# Patient Record
Sex: MALE | Race: BLACK OR AFRICAN AMERICAN | URBAN - METROPOLITAN AREA
[De-identification: names, ages, dates, MRNs, and addresses within clinical notes are randomized per-mention and may not be internally consistent; named-entity substitution may affect disease eponyms.]

---

## 2017-07-19 ENCOUNTER — HISTORICAL (OUTPATIENT)
Dept: INTERNAL MEDICINE | Facility: CLINIC | Age: 27
End: 2017-07-19

## 2017-07-19 LAB
ALBUMIN SERPL-MCNC: 4.1 GM/DL (ref 3.4–5)
ALBUMIN/GLOB SERPL: 1 RATIO (ref 1–2)
ALP SERPL-CCNC: 98 UNIT/L (ref 45–117)
ALT SERPL-CCNC: 24 UNIT/L (ref 12–78)
AST SERPL-CCNC: 27 UNIT/L (ref 15–37)
BILIRUB SERPL-MCNC: 0.5 MG/DL (ref 0.2–1)
BILIRUBIN DIRECT+TOT PNL SERPL-MCNC: 0.1 MG/DL
BILIRUBIN DIRECT+TOT PNL SERPL-MCNC: 0.4 MG/DL
BUN SERPL-MCNC: 14 MG/DL (ref 7–18)
CALCIUM SERPL-MCNC: 9.3 MG/DL (ref 8.5–10.1)
CHLORIDE SERPL-SCNC: 107 MMOL/L (ref 98–107)
CO2 SERPL-SCNC: 30 MMOL/L (ref 21–32)
CREAT SERPL-MCNC: 1.4 MG/DL (ref 0.6–1.3)
GLOBULIN SER-MCNC: 3.8 GM/ML (ref 2.3–3.5)
GLUCOSE SERPL-MCNC: 82 MG/DL (ref 74–106)
HCV AB SERPL QL IA: NONREACTIVE
HIV 1+2 AB+HIV1 P24 AG SERPL QL IA: NONREACTIVE
POTASSIUM SERPL-SCNC: 3.8 MMOL/L (ref 3.5–5.1)
PROT SERPL-MCNC: 7.9 GM/DL (ref 6.4–8.2)
RPR SER QL: NON REACTIVE
SODIUM SERPL-SCNC: 143 MMOL/L (ref 136–145)

## 2017-08-16 ENCOUNTER — HISTORICAL (OUTPATIENT)
Dept: ADMINISTRATIVE | Facility: HOSPITAL | Age: 27
End: 2017-08-16

## 2017-08-16 LAB
APPEARANCE, UA: CLEAR
BACTERIA #/AREA URNS AUTO: ABNORMAL /[HPF]
BILIRUB UR QL STRIP: NEGATIVE
BUN SERPL-MCNC: 11 MG/DL (ref 7–18)
CALCIUM SERPL-MCNC: 9.5 MG/DL (ref 8.5–10.1)
CHLORIDE SERPL-SCNC: 105 MMOL/L (ref 98–107)
CO2 SERPL-SCNC: 33 MMOL/L (ref 21–32)
COLOR UR: NORMAL
CREAT SERPL-MCNC: 1.2 MG/DL (ref 0.6–1.3)
GLUCOSE (UA): NORMAL
GLUCOSE SERPL-MCNC: 95 MG/DL (ref 74–106)
HGB UR QL STRIP: NEGATIVE
HYALINE CASTS #/AREA URNS LPF: ABNORMAL /[LPF]
KETONES UR QL STRIP: NEGATIVE
LEUKOCYTE ESTERASE UR QL STRIP: NEGATIVE
NITRITE UR QL STRIP: NEGATIVE
PH UR STRIP: 6.5 [PH] (ref 4.5–8)
POTASSIUM SERPL-SCNC: 3.6 MMOL/L (ref 3.5–5.1)
PROT UR QL STRIP: NEGATIVE
RBC #/AREA URNS AUTO: ABNORMAL /[HPF]
SODIUM SERPL-SCNC: 140 MMOL/L (ref 136–145)
SP GR UR STRIP: 1.01 (ref 1–1.03)
SQUAMOUS #/AREA URNS LPF: ABNORMAL /[LPF]
UROBILINOGEN UR STRIP-ACNC: NORMAL
WBC #/AREA URNS AUTO: ABNORMAL /HPF

## 2024-07-10 ENCOUNTER — HOSPITAL ENCOUNTER (EMERGENCY)
Facility: HOSPITAL | Age: 34
Discharge: HOME | End: 2024-07-10
Attending: EMERGENCY MEDICINE
Payer: COMMERCIAL

## 2024-07-10 VITALS
TEMPERATURE: 98 F | BODY MASS INDEX: 24.06 KG/M2 | HEART RATE: 80 BPM | SYSTOLIC BLOOD PRESSURE: 136 MMHG | WEIGHT: 158.75 LBS | OXYGEN SATURATION: 99 % | DIASTOLIC BLOOD PRESSURE: 80 MMHG | HEIGHT: 68 IN | RESPIRATION RATE: 16 BRPM

## 2024-07-10 DIAGNOSIS — Z21 NEWLY DIAGNOSED HIV-POSITIVE: Primary | ICD-10-CM

## 2024-07-10 LAB
HIV 1+2 AB+HIV1 P24 AG SERPL QL IA: REACTIVE
HOLD SPECIMEN: NORMAL

## 2024-07-10 PROCEDURE — 87536 HIV-1 QUANT&REVRSE TRNSCRPJ: CPT | Performed by: PHYSICIAN ASSISTANT

## 2024-07-10 PROCEDURE — 87389 HIV-1 AG W/HIV-1&-2 AB AG IA: CPT | Performed by: PHYSICIAN ASSISTANT

## 2024-07-10 PROCEDURE — 99283 EMERGENCY DEPT VISIT LOW MDM: CPT

## 2024-07-10 PROCEDURE — 86701 HIV-1ANTIBODY: CPT | Mod: 59 | Performed by: PHYSICIAN ASSISTANT

## 2024-07-10 NOTE — DISCHARGE INSTRUCTIONS
Referral sent to infectious disease clinic for follow up and treatment.  Call to schedule an appointment.  Follow up with the doctor within 2-3 days.  Return immediately with any concerning symptoms.

## 2024-07-10 NOTE — ED PROVIDER NOTES
Encounter Date: 7/10/2024       History     Chief Complaint   Patient presents with    Abnormal Lab     States was exposed to HIV and PCP told him to come to ED due to positive result     33-year-old male presents to the emergency department after he got a positive HIV result with his PCP.  His doctor advised he report here for confirmation & referral.  Patient states he feels fine denies any symptoms.    The history is provided by the patient. No  was used.     Review of patient's allergies indicates:  No Known Allergies  History reviewed. No pertinent past medical history.  History reviewed. No pertinent surgical history.  No family history on file.  Social History     Tobacco Use    Smoking status: Every Day     Current packs/day: 1.00     Types: Cigarettes    Smokeless tobacco: Never   Substance Use Topics    Drug use: Yes     Frequency: 7.0 times per week     Types: Marijuana     Review of Systems   Constitutional:  Negative for appetite change, chills and fever.   Respiratory:  Negative for cough and shortness of breath.    Cardiovascular:  Negative for chest pain and palpitations.   Gastrointestinal:  Negative for abdominal pain, nausea and vomiting.   Musculoskeletal:  Negative for back pain and neck pain.   Skin:  Negative for rash and wound.   Neurological:  Negative for dizziness, weakness, light-headedness and headaches.       Physical Exam     Initial Vitals [07/10/24 1131]   BP Pulse Resp Temp SpO2   (!) 144/84 78 16 98.3 °F (36.8 °C) 99 %      MAP       --         Physical Exam    Nursing note and vitals reviewed.  Constitutional: He appears well-developed and well-nourished.   HENT:   Nose: Nose normal.   Mouth/Throat: Oropharynx is clear and moist.   Eyes: Conjunctivae are normal.   Cardiovascular:  Normal rate, regular rhythm, normal heart sounds and intact distal pulses.           Pulmonary/Chest: Breath sounds normal. No respiratory distress. He has no wheezes. He has no  rhonchi. He has no rales. He exhibits no tenderness.   Abdominal: Abdomen is soft. Bowel sounds are normal. There is no abdominal tenderness.   Musculoskeletal:         General: Normal range of motion.     Neurological: He is alert and oriented to person, place, and time. GCS score is 15. GCS eye subscore is 4. GCS verbal subscore is 5. GCS motor subscore is 6.   Skin: Skin is warm.         ED Course   Procedures  Labs Reviewed   HIV 1 / 2 ANTIBODY - Abnormal; Notable for the following components:       Result Value    HIV Reactive (*)     All other components within normal limits    Narrative:     This sample was repeatedly reactive by a 4th generation HIV screening method and will be sent for confirmatory testing; see results of supplemental confirmatory HIV 1/2 test.    EXTRA TUBES    Narrative:     The following orders were created for panel order EXTRA TUBES.  Procedure                               Abnormality         Status                     ---------                               -----------         ------                     Light Blue Top Hold[[id number]]                              Final result               Light Green Top Hold[[id number]]                             Final result               Gold Top Hold[[id number]]                                    Final result               Pink Top Hold[[id number]]                                                                Please view results for these tests on the individual orders.   LIGHT BLUE TOP HOLD   LIGHT GREEN TOP HOLD   GOLD TOP HOLD   EXTRA TUBES    Narrative:     The following orders were created for panel order EXTRA TUBES.  Procedure                               Abnormality         Status                     ---------                               -----------         ------                     Lavender Top Hold[[id number]]                               Final result                 Please view results for these tests on the individual orders.    LAVENDER TOP HOLD   HIV 1/2 ANTIBODY DIFFERENTIATION          Imaging Results    None          Medications - No data to display  Medical Decision Making  33-year-old male presents to the emergency department after he got a positive HIV result with his PCP.  His doctor advised he report here for confirmation & referral.  Patient states he feels fine denies any symptoms.    + HIV result.  Referral sent to infectious disease.  Advised patient to call today to set up an appointment as soon as possible.  Patient has a nontoxic appearance, stable vitals.  No concerns for any acute infection.    Amount and/or Complexity of Data Reviewed  Labs: ordered. Decision-making details documented in ED Course.               ED Course as of 07/10/24 1533   Wed Jul 10, 2024   1518 HIV(!): Reactive [ER]   1531 The patient is resting comfortably and in no acute distress.  I personally discussed his test results and  plan.  Gave strict ED precautions and specific conditions for return to the emergency department and importance of follow up with pcp and Infectious Disease..  Patient voices understanding and agrees to the plan discussed. All patients' questions have been answered at this time. He has remained hemodynamically stable throughout entire stay in ED and is stable for discharge home. [ER]      ED Course User Index  [ER] Celina Saleh PA                           Clinical Impression:  Final diagnoses:  [Z21] Newly diagnosed HIV-positive (Primary)          ED Disposition Condition    Discharge Stable          ED Prescriptions    None       Follow-up Information       Follow up With Specialties Details Why Contact Info    [facility]   [address], [place] [address] [phone]             Celina Saleh PA  07/10/24 1535

## 2024-07-11 LAB
HIV 2 AB SERPLBLD QL IA.RAPID: NEGATIVE
HIV1 AB SERPLBLD QL IA.RAPID: POSITIVE

## 2024-07-12 LAB — HIV1 RNA # PLAS NAA DL=20: ABNORMAL COPIES/ML

## 2024-07-23 ENCOUNTER — TELEPHONE (OUTPATIENT)
Dept: INFECTIOUS DISEASES | Facility: CLINIC | Age: 34
End: 2024-07-23
Payer: COMMERCIAL

## 2024-07-23 NOTE — TELEPHONE ENCOUNTER
----- Message from Vera Castañeda sent at 7/23/2024 12:39 PM CDT -----  Regarding: Attention Kristy  Pt called to reschedule  visit.    Pt requesting a call back from Kristy.    Pt number [phone]      Please advise

## 2024-07-24 ENCOUNTER — CLINICAL SUPPORT (OUTPATIENT)
Dept: INFECTIOUS DISEASES | Facility: CLINIC | Age: 34
End: 2024-07-24
Payer: COMMERCIAL

## 2024-07-24 ENCOUNTER — HOSPITAL ENCOUNTER (OUTPATIENT)
Dept: RADIOLOGY | Facility: HOSPITAL | Age: 34
Discharge: HOME | End: 2024-07-24
Attending: NURSE PRACTITIONER
Payer: COMMERCIAL

## 2024-07-24 VITALS — WEIGHT: 159.13 LBS | BODY MASS INDEX: 24.19 KG/M2

## 2024-07-24 DIAGNOSIS — Z21 NEWLY DIAGNOSED HIV-POSITIVE: ICD-10-CM

## 2024-07-24 LAB
25(OH)D3+25(OH)D2 SERPL-MCNC: 68 NG/ML (ref 30–80)
ABS NEUT CALC (OHS): 2.76 X10(3)/MCL (ref 2.1–9.2)
ALBUMIN SERPL-MCNC: 3.8 G/DL (ref 3.5–5)
ALBUMIN/GLOB SERPL: 0.8 RATIO (ref 1.1–2)
ALP SERPL-CCNC: 93 UNIT/L (ref 40–150)
ALT SERPL-CCNC: 18 UNIT/L (ref 0–55)
AMPHET UR QL SCN: NEGATIVE
ANION GAP SERPL CALC-SCNC: 6 MEQ/L
AST SERPL-CCNC: 31 UNIT/L (ref 5–34)
BACTERIA #/AREA URNS AUTO: ABNORMAL /HPF
BARBITURATE SCN PRESENT UR: NEGATIVE
BENZODIAZ UR QL SCN: NEGATIVE
BILIRUB SERPL-MCNC: 0.4 MG/DL
BILIRUB UR QL STRIP.AUTO: NEGATIVE
BUN SERPL-MCNC: 11.5 MG/DL (ref 8.9–20.6)
C TRACH DNA SPEC QL NAA+PROBE: NOT DETECTED
CALCIUM SERPL-MCNC: 9.6 MG/DL (ref 8.4–10.2)
CANNABINOIDS UR QL SCN: POSITIVE
CHLORIDE SERPL-SCNC: 107 MMOL/L (ref 98–107)
CHOLEST SERPL-MCNC: 149 MG/DL
CHOLEST/HDLC SERPL: 4 {RATIO} (ref 0–5)
CLARITY UR: CLEAR
CO2 SERPL-SCNC: 27 MMOL/L (ref 22–29)
COCAINE UR QL SCN: NEGATIVE
COLOR UR AUTO: YELLOW
CREAT SERPL-MCNC: 1.22 MG/DL (ref 0.73–1.18)
CREAT/UREA NIT SERPL: 9
CRYPTOC AG SER QL IA.RAPID: NEGATIVE
ELLIPTOCYTOSIS (OHS): SLIGHT
EOSINOPHIL NFR BLD MANUAL: 0.19 X10(3)/MCL (ref 0–0.9)
EOSINOPHIL NFR BLD MANUAL: 3 % (ref 0–8)
ERYTHROCYTE [DISTWIDTH] IN BLOOD BY AUTOMATED COUNT: 12.5 % (ref 11.5–17)
EST. AVERAGE GLUCOSE BLD GHB EST-MCNC: 91.1 MG/DL
FENTANYL UR QL SCN: NEGATIVE
GFR SERPLBLD CREATININE-BSD FMLA CKD-EPI: >60 ML/MIN/1.73/M2
GLOBULIN SER-MCNC: 5 GM/DL (ref 2.4–3.5)
GLUCOSE SERPL-MCNC: 72 MG/DL (ref 74–100)
GLUCOSE UR QL STRIP: NORMAL
HAV AB SER QL IA: REACTIVE
HAV IGM SERPL QL IA: NONREACTIVE
HBA1C MFR BLD: 4.8 %
HBV CORE AB SERPL QL IA: NONREACTIVE
HBV SURFACE AB SER-ACNC: 25.79 MIU/ML
HBV SURFACE AB SERPL IA-ACNC: REACTIVE M[IU]/ML
HBV SURFACE AG SERPL QL IA: NONREACTIVE
HCT VFR BLD AUTO: 37.3 % (ref 42–52)
HCV AB SERPL QL IA: NONREACTIVE
HDLC SERPL-MCNC: 36 MG/DL (ref 35–60)
HGB BLD-MCNC: 12.3 G/DL (ref 14–18)
HGB UR QL STRIP: NEGATIVE
HYALINE CASTS #/AREA URNS LPF: ABNORMAL /LPF
KETONES UR QL STRIP: NEGATIVE
LDLC SERPL CALC-MCNC: 99 MG/DL (ref 50–140)
LEUKOCYTE ESTERASE UR QL STRIP: 75
LYMPH ABN # BLD MANUAL: 3 %
LYMPHOCYTES NFR BLD MANUAL: 2.64 X10(3)/MCL
LYMPHOCYTES NFR BLD MANUAL: 41 % (ref 13–40)
MCH RBC QN AUTO: 30.2 PG (ref 27–31)
MCHC RBC AUTO-ENTMCNC: 33 G/DL (ref 33–36)
MCV RBC AUTO: 91.6 FL (ref 80–94)
MDMA UR QL SCN: NEGATIVE
MONOCYTES NFR BLD MANUAL: 0.64 X10(3)/MCL (ref 0.1–1.3)
MONOCYTES NFR BLD MANUAL: 10 % (ref 2–11)
MUCOUS THREADS URNS QL MICRO: ABNORMAL /LPF
N GONORRHOEA DNA SPEC QL NAA+PROBE: NOT DETECTED
NEUTROPHILS NFR BLD MANUAL: 43 % (ref 47–80)
NITRITE UR QL STRIP: NEGATIVE
NRBC BLD AUTO-RTO: 0 %
OPIATES UR QL SCN: NEGATIVE
PCP UR QL: NEGATIVE
PH UR STRIP: 7.5 [PH]
PH UR: 7.5 [PH] (ref 3–11)
PLATELET # BLD AUTO: 338 X10(3)/MCL (ref 130–400)
PLATELET # BLD EST: NORMAL 10*3/UL
PMV BLD AUTO: 9.6 FL (ref 7.4–10.4)
POTASSIUM SERPL-SCNC: 3.5 MMOL/L (ref 3.5–5.1)
PROT SERPL-MCNC: 8.8 GM/DL (ref 6.4–8.3)
PROT UR QL STRIP: ABNORMAL
RBC # BLD AUTO: 4.07 X10(6)/MCL (ref 4.7–6.1)
RBC #/AREA URNS AUTO: ABNORMAL /HPF
SMUDGE CELL (OLG): ABNORMAL
SODIUM SERPL-SCNC: 140 MMOL/L (ref 136–145)
SOURCE (OHS): NORMAL
SP GR UR STRIP.AUTO: 1.02 (ref 1–1.03)
SPECIFIC GRAVITY, URINE AUTO (.000) (OHS): 1.02 (ref 1–1.03)
SQUAMOUS #/AREA URNS LPF: ABNORMAL /HPF
T PALLIDUM AB SER QL: NONREACTIVE
TRIGL SERPL-MCNC: 69 MG/DL (ref 34–140)
TSH SERPL-ACNC: 1.29 UIU/ML (ref 0.35–4.94)
UROBILINOGEN UR STRIP-ACNC: ABNORMAL
VLDLC SERPL CALC-MCNC: 14 MG/DL
WBC # BLD AUTO: 6.43 X10(3)/MCL (ref 4.5–11.5)
WBC #/AREA URNS AUTO: ABNORMAL /HPF

## 2024-07-24 PROCEDURE — 83036 HEMOGLOBIN GLYCOSYLATED A1C: CPT

## 2024-07-24 PROCEDURE — 36415 COLL VENOUS BLD VENIPUNCTURE: CPT

## 2024-07-24 PROCEDURE — 80053 COMPREHEN METABOLIC PANEL: CPT

## 2024-07-24 PROCEDURE — 86803 HEPATITIS C AB TEST: CPT

## 2024-07-24 PROCEDURE — 86708 HEPATITIS A ANTIBODY: CPT

## 2024-07-24 PROCEDURE — 85027 COMPLETE CBC AUTOMATED: CPT

## 2024-07-24 PROCEDURE — 81015 MICROSCOPIC EXAM OF URINE: CPT

## 2024-07-24 PROCEDURE — 86704 HEP B CORE ANTIBODY TOTAL: CPT

## 2024-07-24 PROCEDURE — 87491 CHLMYD TRACH DNA AMP PROBE: CPT

## 2024-07-24 PROCEDURE — 71046 X-RAY EXAM CHEST 2 VIEWS: CPT | Mod: TC

## 2024-07-24 PROCEDURE — 87899 AGENT NOS ASSAY W/OPTIC: CPT

## 2024-07-24 PROCEDURE — 80061 LIPID PANEL: CPT

## 2024-07-24 PROCEDURE — 87340 HEPATITIS B SURFACE AG IA: CPT

## 2024-07-24 PROCEDURE — 81001 URINALYSIS AUTO W/SCOPE: CPT

## 2024-07-24 PROCEDURE — 86778 TOXOPLASMA ANTIBODY IGM: CPT

## 2024-07-24 PROCEDURE — 82955 ASSAY OF G6PD ENZYME: CPT

## 2024-07-24 PROCEDURE — 86709 HEPATITIS A IGM ANTIBODY: CPT

## 2024-07-24 PROCEDURE — 86360 T CELL ABSOLUTE COUNT/RATIO: CPT

## 2024-07-24 PROCEDURE — 84443 ASSAY THYROID STIM HORMONE: CPT

## 2024-07-24 PROCEDURE — 99212 OFFICE O/P EST SF 10 MIN: CPT | Mod: PBBFAC,25

## 2024-07-24 PROCEDURE — 87591 N.GONORRHOEAE DNA AMP PROB: CPT

## 2024-07-24 PROCEDURE — 86777 TOXOPLASMA ANTIBODY: CPT

## 2024-07-24 PROCEDURE — 87536 HIV-1 QUANT&REVRSE TRNSCRPJ: CPT

## 2024-07-24 PROCEDURE — 86780 TREPONEMA PALLIDUM: CPT

## 2024-07-24 PROCEDURE — 86706 HEP B SURFACE ANTIBODY: CPT

## 2024-07-24 PROCEDURE — 82306 VITAMIN D 25 HYDROXY: CPT

## 2024-07-24 PROCEDURE — 87086 URINE CULTURE/COLONY COUNT: CPT

## 2024-07-24 PROCEDURE — 80307 DRUG TEST PRSMV CHEM ANLYZR: CPT

## 2024-07-24 PROCEDURE — 86480 TB TEST CELL IMMUN MEASURE: CPT

## 2024-07-24 NOTE — PROGRESS NOTES
B20 INTAKE          Ramo is a 32 y/o AA MSM who was diagnosed 2 weeks ago with HIV. He has a 3 y/o adopted son & is  to a male with an unknown HIV status, waiting on results.                             DATE: 7/24/24  WEIGHT:  159.1 lb  [facility]  DATE DX'D: 7/10/24            LOCATION: [facility] ED  CD4/VIRAL LOAD:   ---   / [id number]  on 7/10/24          [x] DONE TODAY         CURRENT ARV THERAPY: [x] NONE           WHO KNOWS OF STATUS:          MARITAL STATUS:   [] S   [x] M   [] D   [] W   [] SEP  CHILDREN:   [x] Y   [] N       HOW MANY:  No biological children, 1 adopted 3 y/o  son  IN A CURRENT RELATIONSHIP:  [x] Y   [] N      [x] M   [] F   [] B   PROTECTION USED:  [] ALWAYS   [x] SOMETIMES   [] NEVER  # SEXUAL ENCOUNTERS IN LAST YEAR:  ~ 16-17  IN LIFETIME:  ~ 70-80               PAST RELATIONSHIPS: [] M   [] F   [x] B (mostly male) PROTECTION USED:  [] ALWAYS     [x] SOMETIMES      [] NEVER  HX OF STD:  [x] Y   [] N   TYPE: Gonorrhea >10 yrs ago         TREATED:  [x] Y   [] N   [] UNSURE  DRUG ALLERGIES:  [x] NKDA       PMH: [x] NEG       PSH: [x] NONE     ETOH USE: [] Y   [] N   [x] OCCASIONAL   TOBACCO USE: [x] Y   [] N  DRUG USE/IVD USE:  THC   LAST USE:  Today   MENTAL HEALTH ISSUES: [] NA  [x] DEPRESSION   [] ANXIETY  [x] BIPOLAR  [] SCHIZO  [] SI/HI ATTEMPT  BLOOD TRANSFUSION: [] Y   [x] N                   TATTOOS:  [x] Y   [] N     PROFESSIONAL:  [x] Y   [] N     IN nursing home:  [] Y   [x] N  PIERCINGS:  [] Y   [] N     PROFESSIONAL:    [x] Y   [] N    PREVIOUS OI'S: [] NA   [x] ORAL CANDIDIASIS   [] PJP   [] CNS  []  TOXO     [] CMV     [] MAC          [] HISTO  [] HERPES ZOSTER   [] KAPOSI   [] CRYPTO MENINGITIS        INCARCERATION: [x] Y   [] N     HOW LONG?   40 days  [facility]? [facility]  PENDING WARRANTS: [] Y   [x] Denies  **Follow up appointment set with [x]KAYE   []TALI   []TIFFANY     DATE/TIME: 8/5/24 @10:30  Following intake, Ramo was given HIV  education, along with literature and condoms. Labs were collected and he was brought to the Xray dept for a CXR.

## 2024-07-25 LAB
CD3 CELLS # BLD: 871 CELLS/MCL (ref 550–2202)
CD3 CELLS NFR BLD: 76 % (ref 58–86)
CD3+CD4+ CELLS # BLD: 199 CELLS/MCL (ref 365–1437)
CD3+CD4+ CELLS NFR BLD: 17 % (ref 32–64)
CD3+CD4+ CELLS/CD3+CD8+ CLL BLD: 0.3 %
CD3+CD8+ CELLS # BLD: 588 CELLS/MCL (ref 171–846)
CD3+CD8+ CELLS NFR BLD: 51 % (ref 15–40)
CD45 CELLS # BLD: 1.15 THOU/MCL (ref 0.82–2.84)
HIV1 RNA # PLAS NAA DL=20: ABNORMAL COPIES/ML

## 2024-07-26 LAB
BACTERIA UR CULT: NO GROWTH
G6PD RBC-CCNT: 0.9 U/G HB (ref 8–11.9)
GAMMA INTERFERON BACKGROUND BLD IA-ACNC: 0.13 IU/ML
M TB IFN-G BLD-IMP: NEGATIVE
M TB IFN-G CD4+ BCKGRND COR BLD-ACNC: -0.06 IU/ML
M TB IFN-G CD4+CD8+ BCKGRND COR BLD-ACNC: -0.06 IU/ML
MITOGEN IGNF BCKGRD COR BLD-ACNC: >10 IU/ML
T GONDII IGG SER QL IA: NEGATIVE
T GONDII IGG SER-ACNC: <3 IU/ML
T GONDII IGM SERPL QL IA: NEGATIVE

## 2024-08-05 ENCOUNTER — OFFICE VISIT (OUTPATIENT)
Dept: INFECTIOUS DISEASES | Facility: CLINIC | Age: 34
End: 2024-08-05
Payer: COMMERCIAL

## 2024-08-05 ENCOUNTER — TELEPHONE (OUTPATIENT)
Dept: INFECTIOUS DISEASES | Facility: CLINIC | Age: 34
End: 2024-08-05

## 2024-08-05 VITALS
HEIGHT: 68 IN | HEART RATE: 91 BPM | TEMPERATURE: 99 F | RESPIRATION RATE: 16 BRPM | SYSTOLIC BLOOD PRESSURE: 121 MMHG | BODY MASS INDEX: 23.84 KG/M2 | DIASTOLIC BLOOD PRESSURE: 73 MMHG | WEIGHT: 157.31 LBS

## 2024-08-05 DIAGNOSIS — Z11.3 ROUTINE SCREENING FOR STI (SEXUALLY TRANSMITTED INFECTION): ICD-10-CM

## 2024-08-05 DIAGNOSIS — B20 AIDS (ACQUIRED IMMUNODEFICIENCY SYNDROME), CD4 <=200: Primary | ICD-10-CM

## 2024-08-05 DIAGNOSIS — F17.210 CIGARETTE NICOTINE DEPENDENCE WITHOUT COMPLICATION: ICD-10-CM

## 2024-08-05 DIAGNOSIS — B37.0 CANDIDIASIS OF MOUTH: ICD-10-CM

## 2024-08-05 LAB
C TRACH DNA SPEC QL NAA+PROBE: NOT DETECTED
N GONORRHOEA DNA SPEC QL NAA+PROBE: DETECTED
SOURCE (OHS): ABNORMAL

## 2024-08-05 PROCEDURE — 3044F HG A1C LEVEL LT 7.0%: CPT | Mod: CPTII,,, | Performed by: NURSE PRACTITIONER

## 2024-08-05 PROCEDURE — 3008F BODY MASS INDEX DOCD: CPT | Mod: CPTII,,, | Performed by: NURSE PRACTITIONER

## 2024-08-05 PROCEDURE — 99205 OFFICE O/P NEW HI 60 MIN: CPT | Mod: S$PBB,,, | Performed by: NURSE PRACTITIONER

## 2024-08-05 PROCEDURE — 87591 N.GONORRHOEAE DNA AMP PROB: CPT | Performed by: NURSE PRACTITIONER

## 2024-08-05 PROCEDURE — 99214 OFFICE O/P EST MOD 30 MIN: CPT | Mod: PBBFAC | Performed by: NURSE PRACTITIONER

## 2024-08-05 PROCEDURE — 3078F DIAST BP <80 MM HG: CPT | Mod: CPTII,,, | Performed by: NURSE PRACTITIONER

## 2024-08-05 PROCEDURE — 87491 CHLMYD TRACH DNA AMP PROBE: CPT | Performed by: NURSE PRACTITIONER

## 2024-08-05 PROCEDURE — 1159F MED LIST DOCD IN RCRD: CPT | Mod: CPTII,,, | Performed by: NURSE PRACTITIONER

## 2024-08-05 PROCEDURE — 3074F SYST BP LT 130 MM HG: CPT | Mod: CPTII,,, | Performed by: NURSE PRACTITIONER

## 2024-08-05 PROCEDURE — 1160F RVW MEDS BY RX/DR IN RCRD: CPT | Mod: CPTII,,, | Performed by: NURSE PRACTITIONER

## 2024-08-05 RX ORDER — DIVALPROEX SODIUM 500 MG/1
500 TABLET, DELAYED RELEASE ORAL DAILY
COMMUNITY

## 2024-08-05 RX ORDER — FLUCONAZOLE 200 MG/1
200 TABLET ORAL DAILY
Qty: 14 TABLET | Refills: 0 | Status: SHIPPED | OUTPATIENT
Start: 2024-08-05 | End: 2024-08-19

## 2024-08-05 RX ORDER — SULFAMETHOXAZOLE AND TRIMETHOPRIM 800; 160 MG/1; MG/1
1 TABLET ORAL DAILY
Qty: 30 TABLET | Refills: 2 | Status: SHIPPED | OUTPATIENT
Start: 2024-08-05

## 2024-08-05 RX ORDER — BUPROPION HYDROCHLORIDE 300 MG/1
300 TABLET ORAL
COMMUNITY
Start: 2024-05-23

## 2024-08-06 LAB
C TRACH RRNA SPEC QL NAA+PROBE: NEGATIVE
N GONORRHOEA RRNA SPEC QL NAA+PROBE: NEGATIVE
SPECIMEN SOURCE: NORMAL
SPECIMEN SOURCE: NORMAL

## 2024-08-07 ENCOUNTER — CLINICAL SUPPORT (OUTPATIENT)
Dept: INFECTIOUS DISEASES | Facility: CLINIC | Age: 34
End: 2024-08-07
Payer: COMMERCIAL

## 2024-08-07 DIAGNOSIS — A54.6 RECTAL GONORRHEA: Primary | ICD-10-CM

## 2024-08-07 PROCEDURE — 96372 THER/PROPH/DIAG INJ SC/IM: CPT | Mod: PBBFAC

## 2024-08-07 PROCEDURE — 99211 OFF/OP EST MAY X REQ PHY/QHP: CPT | Mod: PBBFAC

## 2024-08-07 RX ORDER — LIDOCAINE HYDROCHLORIDE 10 MG/ML
1 INJECTION, SOLUTION EPIDURAL; INFILTRATION; INTRACAUDAL; PERINEURAL
Status: COMPLETED | OUTPATIENT
Start: 2024-08-07 | End: 2024-08-07

## 2024-08-07 RX ORDER — CEFTRIAXONE 500 MG/1
500 INJECTION, POWDER, FOR SOLUTION INTRAMUSCULAR; INTRAVENOUS
Status: COMPLETED | OUTPATIENT
Start: 2024-08-07 | End: 2024-08-07

## 2024-08-07 RX ADMIN — LIDOCAINE HYDROCHLORIDE 10 MG: 10 INJECTION, SOLUTION EPIDURAL; INFILTRATION; INTRACAUDAL; PERINEURAL at 10:08

## 2024-08-07 RX ADMIN — CEFTRIAXONE SODIUM 500 MG: 500 INJECTION, POWDER, FOR SOLUTION INTRAMUSCULAR; INTRAVENOUS at 10:08

## 2024-09-16 ENCOUNTER — LAB VISIT (OUTPATIENT)
Dept: LAB | Facility: HOSPITAL | Age: 34
End: 2024-09-16
Attending: NURSE PRACTITIONER
Payer: COMMERCIAL

## 2024-09-16 ENCOUNTER — OFFICE VISIT (OUTPATIENT)
Dept: INFECTIOUS DISEASES | Facility: CLINIC | Age: 34
End: 2024-09-16
Payer: COMMERCIAL

## 2024-09-16 VITALS
RESPIRATION RATE: 16 BRPM | WEIGHT: 162.06 LBS | DIASTOLIC BLOOD PRESSURE: 66 MMHG | TEMPERATURE: 98 F | SYSTOLIC BLOOD PRESSURE: 103 MMHG | HEIGHT: 68 IN | HEART RATE: 96 BPM | BODY MASS INDEX: 24.56 KG/M2

## 2024-09-16 DIAGNOSIS — Z23 IMMUNIZATION DUE: ICD-10-CM

## 2024-09-16 DIAGNOSIS — B20 AIDS (ACQUIRED IMMUNODEFICIENCY SYNDROME), CD4 <=200: Primary | ICD-10-CM

## 2024-09-16 DIAGNOSIS — B20 AIDS (ACQUIRED IMMUNODEFICIENCY SYNDROME), CD4 <=200: ICD-10-CM

## 2024-09-16 LAB
ALBUMIN SERPL-MCNC: 3.9 G/DL (ref 3.5–5)
ALBUMIN/GLOB SERPL: 1 RATIO (ref 1.1–2)
ALP SERPL-CCNC: 81 UNIT/L (ref 40–150)
ALT SERPL-CCNC: 17 UNIT/L (ref 0–55)
ANION GAP SERPL CALC-SCNC: 7 MEQ/L
AST SERPL-CCNC: 29 UNIT/L (ref 5–34)
BASOPHILS # BLD AUTO: 0.02 X10(3)/MCL
BASOPHILS NFR BLD AUTO: 0.4 %
BILIRUB SERPL-MCNC: 0.4 MG/DL
BUN SERPL-MCNC: 11.9 MG/DL (ref 8.9–20.6)
CALCIUM SERPL-MCNC: 9.9 MG/DL (ref 8.4–10.2)
CHLORIDE SERPL-SCNC: 103 MMOL/L (ref 98–107)
CO2 SERPL-SCNC: 29 MMOL/L (ref 22–29)
CREAT SERPL-MCNC: 1.28 MG/DL (ref 0.73–1.18)
CREAT/UREA NIT SERPL: 9
EOSINOPHIL # BLD AUTO: 0.06 X10(3)/MCL (ref 0–0.9)
EOSINOPHIL NFR BLD AUTO: 1.1 %
ERYTHROCYTE [DISTWIDTH] IN BLOOD BY AUTOMATED COUNT: 13 % (ref 11.5–17)
GFR SERPLBLD CREATININE-BSD FMLA CKD-EPI: >60 ML/MIN/1.73/M2
GLOBULIN SER-MCNC: 4.1 GM/DL (ref 2.4–3.5)
GLUCOSE SERPL-MCNC: 75 MG/DL (ref 74–100)
HCT VFR BLD AUTO: 36.8 % (ref 42–52)
HGB BLD-MCNC: 12.8 G/DL (ref 14–18)
IMM GRANULOCYTES # BLD AUTO: 0.02 X10(3)/MCL (ref 0–0.04)
IMM GRANULOCYTES NFR BLD AUTO: 0.4 %
LYMPHOCYTES # BLD AUTO: 1.33 X10(3)/MCL (ref 0.6–4.6)
LYMPHOCYTES NFR BLD AUTO: 24 %
MCH RBC QN AUTO: 30.8 PG (ref 27–31)
MCHC RBC AUTO-ENTMCNC: 34.8 G/DL (ref 33–36)
MCV RBC AUTO: 88.7 FL (ref 80–94)
MONOCYTES # BLD AUTO: 0.46 X10(3)/MCL (ref 0.1–1.3)
MONOCYTES NFR BLD AUTO: 8.3 %
NEUTROPHILS # BLD AUTO: 3.65 X10(3)/MCL (ref 2.1–9.2)
NEUTROPHILS NFR BLD AUTO: 65.8 %
NRBC BLD AUTO-RTO: 0 %
PLATELET # BLD AUTO: 462 X10(3)/MCL (ref 130–400)
PMV BLD AUTO: 8.3 FL (ref 7.4–10.4)
POTASSIUM SERPL-SCNC: 4.2 MMOL/L (ref 3.5–5.1)
PROT SERPL-MCNC: 8 GM/DL (ref 6.4–8.3)
RBC # BLD AUTO: 4.15 X10(6)/MCL (ref 4.7–6.1)
SODIUM SERPL-SCNC: 139 MMOL/L (ref 136–145)
WBC # BLD AUTO: 5.54 X10(3)/MCL (ref 4.5–11.5)

## 2024-09-16 PROCEDURE — 1160F RVW MEDS BY RX/DR IN RCRD: CPT | Mod: CPTII,,, | Performed by: NURSE PRACTITIONER

## 2024-09-16 PROCEDURE — 87536 HIV-1 QUANT&REVRSE TRNSCRPJ: CPT

## 2024-09-16 PROCEDURE — 1159F MED LIST DOCD IN RCRD: CPT | Mod: CPTII,,, | Performed by: NURSE PRACTITIONER

## 2024-09-16 PROCEDURE — 3008F BODY MASS INDEX DOCD: CPT | Mod: CPTII,,, | Performed by: NURSE PRACTITIONER

## 2024-09-16 PROCEDURE — 99214 OFFICE O/P EST MOD 30 MIN: CPT | Mod: PBBFAC,25 | Performed by: NURSE PRACTITIONER

## 2024-09-16 PROCEDURE — 80053 COMPREHEN METABOLIC PANEL: CPT

## 2024-09-16 PROCEDURE — 36415 COLL VENOUS BLD VENIPUNCTURE: CPT

## 2024-09-16 PROCEDURE — 3044F HG A1C LEVEL LT 7.0%: CPT | Mod: CPTII,,, | Performed by: NURSE PRACTITIONER

## 2024-09-16 PROCEDURE — 90656 IIV3 VACC NO PRSV 0.5 ML IM: CPT | Mod: PBBFAC

## 2024-09-16 PROCEDURE — 90471 IMMUNIZATION ADMIN: CPT | Mod: PBBFAC

## 2024-09-16 PROCEDURE — 3078F DIAST BP <80 MM HG: CPT | Mod: CPTII,,, | Performed by: NURSE PRACTITIONER

## 2024-09-16 PROCEDURE — 90472 IMMUNIZATION ADMIN EACH ADD: CPT | Mod: PBBFAC

## 2024-09-16 PROCEDURE — 86359 T CELLS TOTAL COUNT: CPT

## 2024-09-16 PROCEDURE — 85025 COMPLETE CBC W/AUTO DIFF WBC: CPT

## 2024-09-16 PROCEDURE — 90677 PCV20 VACCINE IM: CPT | Mod: PBBFAC

## 2024-09-16 PROCEDURE — 3074F SYST BP LT 130 MM HG: CPT | Mod: CPTII,,, | Performed by: NURSE PRACTITIONER

## 2024-09-16 PROCEDURE — 99213 OFFICE O/P EST LOW 20 MIN: CPT | Mod: S$PBB,,, | Performed by: NURSE PRACTITIONER

## 2024-09-16 RX ORDER — SULFAMETHOXAZOLE AND TRIMETHOPRIM 800; 160 MG/1; MG/1
1 TABLET ORAL DAILY
Qty: 30 TABLET | Refills: 3 | Status: SHIPPED | OUTPATIENT
Start: 2024-09-16

## 2024-09-16 RX ADMIN — PNEUMOCOCCAL 20-VALENT CONJUGATE VACCINE 0.5 ML
2.2; 2.2; 2.2; 2.2; 2.2; 2.2; 2.2; 2.2; 2.2; 2.2; 2.2; 2.2; 2.2; 2.2; 2.2; 2.2; 4.4; 2.2; 2.2; 2.2 INJECTION, SUSPENSION INTRAMUSCULAR at 10:09

## 2024-09-16 RX ADMIN — INFLUENZA VIRUS VACCINE 0.5 ML: 15; 15; 15 SUSPENSION INTRAMUSCULAR at 10:09

## 2024-09-16 NOTE — PROGRESS NOTES
Patient ID: Ramo Aguirre 33 y.o.     Chief Complaint:   Chief Complaint   Patient presents with    Follow-up     B20        HPI:    9/16/24  Ramo Aguirre is a 32 y/o AAM HIV patient here for HIV f/u visit. Dx 7/10/24.  Family members are unaware of his dx.  MSM, versatile. Pt reports 100% adherence to Biktarvy since starting medication last month. He is tolerating the meds well. Denies fever, headache, chills, visual problems, N/V/D, SOB, cough, chest pain or abd pain. Reviewed labs from 7/24/24 HIV VL 1.22 million units (K103N), CD4 199/17%, syphilis ab NR, QG neg, crypto neg, Hep A IGG +, Hep B s ab +, and creatinine 1.22.  Pt is scheduled to see the eye doctor 11/26/24.  He is due for a flu and PCV 20. Pt is amenable.      8/5/24  Ramo Aguirre is a 32 y/o AAM HIV patient here for initial HIV visit. Dx 7/10/24. Family members are unaware of his dx.  MSM, versatile.  Pt states he has had >70 partners some with condoms and some without condoms.  He has approx 10 tattoos. Denies alcohol, but states he smokes THC twice daily. Smokes 1 ppd of cigarettes.  Pt denies fever, headache, chills, visual problems, N/V/D, SOB, cough, chest pain or abd pain. Complains of thrush that comes and goes. Reviewed labs from 7/24/24 HIV VL 1.22 million units (K103N), CD4 199/17%, syphilis ab NR, QG neg, crypto neg, Hep A IGG +, Hep B s ab +, and creatinine 1.22. Pt will need STI screening and an eye exam.  I will send referral for eye clinic.           History reviewed. No pertinent past medical history.     History reviewed. No pertinent surgical history.     Social History     Socioeconomic History    Marital status: Single   Tobacco Use    Smoking status: Every Day     Current packs/day: 1.00     Types: Cigarettes    Smokeless tobacco: Never   Substance and Sexual Activity    Alcohol use: Never    Drug use: Yes     Types: Marijuana    Sexual activity: Yes     Partners: Male        No family history on file.     Review of  "patient's allergies indicates:  No Known Allergies     Immunization History   Administered Date(s) Administered    COVID-19, MRNA, LN-S, PF (Pfizer) (Purple Cap) 10/12/2021, 11/09/2021    DTaP 1991, 02/17/1992, 04/16/1992, 10/01/1993, 10/05/1995    HIB 1991, 02/17/1992, 04/16/1992, 01/05/1993    Hepatitis A, Adult 12/07/2016, 08/16/2017    Hepatitis B, Adult 11/22/2005, 03/23/2006    IPV 1991, 02/17/1992, 10/01/1993, 10/05/1995    Influenza 11/22/2005    Influenza - Quadrivalent 11/21/2016    Influenza - Trivalent - PF (ADULT) 09/16/2024    MMR 10/01/1993, 10/05/1995    Meningococcal Conjugate (MCV4P) 07/14/2009    Pneumococcal Conjugate - 20 Valent 09/16/2024    Td (ADULT) 11/22/2005    Varicella 07/12/2000, 07/14/2009        Review of Systems   Constitutional: Negative.    HENT: Negative.     Eyes: Negative.    Respiratory: Negative.     Cardiovascular: Negative.    Gastrointestinal: Negative.    Genitourinary: Negative.    Musculoskeletal: Negative.    Skin: Negative.    Neurological: Negative.    Endo/Heme/Allergies: Negative.    Psychiatric/Behavioral: Negative.     All other systems reviewed and are negative.         Objective:      /66   Pulse 96   Temp 98.1 °F (36.7 °C)   Resp 16   Ht 5' 8" (1.727 m)   Wt 73.5 kg (162 lb 0.6 oz)   BMI 24.64 kg/m²      Physical Exam  Vitals reviewed.   Constitutional:       General: He is not in acute distress.     Appearance: Normal appearance.   HENT:      Head: Normocephalic.      Right Ear: External ear normal.      Left Ear: External ear normal.      Nose: Nose normal.      Mouth/Throat:      Mouth: Mucous membranes are moist.      Pharynx: Oropharynx is clear.   Eyes:      General: No scleral icterus.     Extraocular Movements: Extraocular movements intact.      Conjunctiva/sclera: Conjunctivae normal.      Pupils: Pupils are equal, round, and reactive to light.   Cardiovascular:      Rate and Rhythm: Normal rate and regular rhythm.      " Pulses: Normal pulses.      Heart sounds: Normal heart sounds.   Pulmonary:      Effort: Pulmonary effort is normal. No respiratory distress.      Breath sounds: Normal breath sounds.   Abdominal:      General: Bowel sounds are normal. There is no distension.      Palpations: Abdomen is soft. There is no mass.      Tenderness: There is no abdominal tenderness. There is no right CVA tenderness or left CVA tenderness.      Hernia: No hernia is present.   Musculoskeletal:         General: No tenderness or signs of injury. Normal range of motion.      Cervical back: Normal range of motion and neck supple.      Right lower leg: No edema.      Left lower leg: No edema.   Lymphadenopathy:      Cervical: No cervical adenopathy.   Skin:     General: Skin is warm and dry.      Capillary Refill: Capillary refill takes less than 2 seconds.      Findings: No erythema or lesion.   Neurological:      General: No focal deficit present.      Mental Status: He is alert and oriented to person, place, and time. Mental status is at baseline.   Psychiatric:         Mood and Affect: Mood normal.         Behavior: Behavior normal.         Thought Content: Thought content normal.         Judgment: Judgment normal.          Labs:   Lab Results   Component Value Date    WBC 5.54 09/16/2024    HGB 12.8 (L) 09/16/2024    HCT 36.8 (L) 09/16/2024    MCV 88.7 09/16/2024     (H) 09/16/2024       CMP  Sodium   Date Value Ref Range Status   07/24/2024 140 136 - 145 mmol/L Final     Potassium   Date Value Ref Range Status   07/24/2024 3.5 3.5 - 5.1 mmol/L Final     Chloride   Date Value Ref Range Status   07/24/2024 107 98 - 107 mmol/L Final     CO2   Date Value Ref Range Status   07/24/2024 27 22 - 29 mmol/L Final     Blood Urea Nitrogen   Date Value Ref Range Status   07/24/2024 11.5 8.9 - 20.6 mg/dL Final     Creatinine   Date Value Ref Range Status   07/24/2024 1.22 (H) 0.73 - 1.18 mg/dL Final     Calcium   Date Value Ref Range Status    07/24/2024 9.6 8.4 - 10.2 mg/dL Final     Albumin   Date Value Ref Range Status   07/24/2024 3.8 3.5 - 5.0 g/dL Final     Bilirubin Total   Date Value Ref Range Status   07/24/2024 0.4 <=1.5 mg/dL Final     ALP   Date Value Ref Range Status   07/24/2024 93 40 - 150 unit/L Final     AST   Date Value Ref Range Status   07/24/2024 31 5 - 34 unit/L Final     ALT   Date Value Ref Range Status   07/24/2024 18 0 - 55 unit/L Final     eGFR   Date Value Ref Range Status   07/24/2024 >60 mL/min/1.73/m2 Final     Lab Results   Component Value Date    TSH 1.286 07/24/2024     Hep C Ab Interp   Date Value Ref Range Status   07/24/2024 Nonreactive Nonreactive Final     Syphilis Antibody   Date Value Ref Range Status   07/24/2024 Nonreactive Nonreactive, Equivocal Final     RPR   Date Value Ref Range Status   07/19/2017 Non reactive  Final     Cholesterol Total   Date Value Ref Range Status   07/24/2024 149 <=200 mg/dL Final     HDL Cholesterol   Date Value Ref Range Status   07/24/2024 36 35 - 60 mg/dL Final     Triglyceride   Date Value Ref Range Status   07/24/2024 69 34 - 140 mg/dL Final     Cholesterol/HDL Ratio   Date Value Ref Range Status   07/24/2024 4 0 - 5 Final     Very Low Density Lipoprotein   Date Value Ref Range Status   07/24/2024 14  Final     LDL Cholesterol   Date Value Ref Range Status   07/24/2024 99.00 50.00 - 140.00 mg/dL Final     Vitamin D   Date Value Ref Range Status   07/24/2024 68 30 - 80 ng/mL Final     No results found for this or any previous visit.  Results for orders placed or performed in visit on 07/24/24   HIV-1 RNA, Quantitative, PCR with Reflex to Genotype   Result Value Ref Range    HIV-1 RNA Detect/Quant, P [id number] (A) Undetected copies/mL     Results for orders placed or performed in visit on 07/24/24   Quantiferon Gold TB   Result Value Ref Range    QuantiFERON-Tb Gold Plus Result Negative Negative    TB1 Ag minus Nil Result -0.06 IU/mL    TB2 Ag minus Nil Result -0.06 IU/mL    Mitogen  minus Nil Result >10.00 IU/mL    Nil Result 0.13 IU/mL     Results for orders placed or performed in visit on 08/05/24   C.trach/N.gonor AMP RNA    Specimen: Throat   Result Value Ref Range    Source throate     Chlamydia trachomatis amplified RNA Negative Negative    Neisseria gonorrhoeae amplified RNA Negative Negative    SOURCE: throat      Results for orders placed or performed in visit on 07/24/24   Urinalysis   Result Value Ref Range    Color, UA Yellow Yellow, Light-Yellow, Dark Yellow, [place], Straw    Appearance, UA Clear Clear    Specific Gravity, UA 1.025 1.005 - 1.030    pH, UA 7.5 5.0 - 8.5    Protein, UA Trace (A) Negative    Glucose, UA Normal Negative, Normal    Ketones, UA Negative Negative    Blood, UA Negative Negative    Bilirubin, UA Negative Negative    Urobilinogen, UA 1+ (A) 0.2, 1.0, Normal    Nitrites, UA Negative Negative    Leukocyte Esterase, UA 75 (A) Negative    RBC, UA 0-5 None Seen, 0-2, 3-5, 0-5 /HPF    WBC, UA 11-20 (A) None Seen, 0-2, 3-5, 0-5 /HPF    Bacteria, UA None Seen None Seen /HPF    Squamous Epithelial Cells, UA None Seen None Seen /HPF    Mucous, UA Trace (A) None Seen /LPF    Hyaline Casts, UA None Seen None Seen /lpf       Imaging: Reviewed most recent relevant imaging studies available, notable results highlighted in this note    Medications:     Current Outpatient Medications   Medication Instructions    chyuprwwh-upfzcrid-kziapey ala (BIKTARVY) -25 mg (25 kg or greater) 1 tablet, Oral, Daily    buPROPion (WELLBUTRIN XL) 300 mg, Oral    divalproex (DEPAKOTE) 500 mg, Oral, Daily    sulfamethoxazole-trimethoprim 800-160mg (BACTRIM DS) 800-160 mg Tab 1 tablet, Oral, Daily       Assessment:       Problem List Items Addressed This Visit    None  Visit Diagnoses       AIDS (acquired immunodeficiency syndrome), CD4 <=200    -  Primary    Relevant Medications    hlemejffj-znitlszp-kyxidfz ala (BIKTARVY) -25 mg (25 kg or greater)    sulfamethoxazole-trimethoprim  800-160mg (BACTRIM DS) 800-160 mg Tab    Other Relevant Orders    HIV-1 RNA, Quantitative, PCR with Reflex to Genotype    CD4 T-Jac[place]a Cells    CBC Auto Differential (Completed)    Comprehensive Metabolic Panel    Immunization due        Relevant Medications    influenza (Flulaval, Fluzone, Fluarix) 45 mcg/0.5 mL IM vaccine (> or = 6 mo) 0.5 mL (Completed)    pneumoc 20-sparkle conj-dip cr(PF) (PREVNAR-20 (PF)) injection Syrg 0.5 mL (Completed)               Plan:      AIDS (acquired immunodeficiency syndrome), CD4 <=200  -     HIV-1 RNA, Quantitative, PCR with Reflex to Genotype; Future; Expected date: 09/16/2024  -     CD4 T-Jacumba Cells; Future; Expected date: 09/16/2024  -     CBC Auto Differential; Future; Expected date: 09/16/2024  -     Comprehensive Metabolic Panel; Future; Expected date: 09/16/2024  -     lqxlyliqu-axarxagw-txbuxwj ala (BIKTARVY) -25 mg (25 kg or greater); Take 1 tablet by mouth once daily.  Dispense: 30 tablet; Refill: 3  -     sulfamethoxazole-trimethoprim 800-160mg (BACTRIM DS) 800-160 mg Tab; Take 1 tablet by mouth once daily.  Dispense: 30 tablet; Refill: 3  Extensive education provided regarding adherence, sexual health, medication management, attending scheduled visits, risks and benefits of medication.  Use condoms for all sexual encounters.  Consider complete abstinence until virally suppressed.  Notify sexual partner(s) of disease status.   Uncontrolled HIV can cause not only a weakened immune system & leave one susceptible to opportunistic infections, but can also lead to renal, cardiac, neurological, cardiovascular, and hepatic dysfunction as a result of chronic inflammation.  Take all medications as prescribed and keep follow-up with providers as scheduled.   Incorrect use of HIV medications can lead to developing resistance and loss of control of virus.  This can also limit future treatment options.   Notify our office for any concerns that may emerge, particularly if you  are having trouble with obtaining medication or changes in insurance status so that we may assist you.  Continue Biktarvy 1 po q day and Bactrim DS 1 po q day   Labs today   RTC 3 months with Ariana for an in office visit   STI screening 8/5/24  Referred to eye clinic 8/5/24. Appt scheduled for 11/26/24    Immunization due  -     influenza (Flulaval, Fluzone, Fluarix) 45 mcg/0.5 mL IM vaccine (> or = 6 mo) 0.5 mL  -     pneumoc 20-sparkle conj-dip cr(PF) (PREVNAR-20 (PF)) injection Syrg 0.5 mL         25 minutes of total time spent on the encounter, which includes face to face time and non-face to face time preparing to see the patient (eg, review of tests), Obtaining and/or reviewing separately obtained history, Documenting clinical information in the electronic or other health record, Independently interpreting results (not separately reported) and communicating results to the patient/family/caregiver, or Care coordination (not separately reported).

## 2024-09-17 LAB
CD3 CELLS # BLD: 935 CELLS/MCL (ref 550–2202)
CD3 CELLS NFR BLD: 74 % (ref 58–86)
CD3+CD4+ CELLS # BLD: 338 CELLS/MCL (ref 365–1437)
CD3+CD4+ CELLS NFR BLD: 27 % (ref 32–64)
CD3+CD4+ CELLS/CD3+CD8+ CLL BLD: 0.7 %
CD3+CD8+ CELLS # BLD: 509 CELLS/MCL (ref 171–846)
CD3+CD8+ CELLS NFR BLD: 40 % (ref 15–40)
CD45 CELLS # BLD: 1.26 THOU/MCL (ref 0.82–2.84)

## 2024-09-18 LAB — HIV1 RNA # PLAS NAA DL=20: 480 COPIES/ML

## 2024-09-19 ENCOUNTER — TELEPHONE (OUTPATIENT)
Dept: INFECTIOUS DISEASES | Facility: CLINIC | Age: 34
End: 2024-09-19
Payer: COMMERCIAL

## 2024-09-19 NOTE — TELEPHONE ENCOUNTER
----- Message from SANDRA Gerardo sent at 9/19/2024 11:14 AM CDT -----  Reviewed labs. HIV VL is still elevated at 480. This is an improvement from 1.2 million.  Creatinine is 1.28. I would like to repeat in 6 weeks instead of 3 months. Please contact patient with results and reschedule the appt.  I recommend the following for elevated creatinine:  Keep hydrated.  Avoid NSAIDs (ibuprofen, naproxen, aleve, advil, toradol or mobic)  Keep BP (goal <130/80) and cholesterol (LDL <100) within recommended goals  Low sodium diet encouraged; 2 grams per day  Increase exercise activity; 30 minutes 3-5 x per week   Maintain a healthy weight  Smoking cessation encouraged   Decrease ETOH intake/encouraged cessation   Medication compliance stressed   Keep all follow up appointments as scheduled

## 2024-09-19 NOTE — PROGRESS NOTES
Reviewed labs. HIV VL is still elevated at 480. This is an improvement from 1.2 million.  Creatinine is 1.28. I would like to repeat in 6 weeks instead of 3 months. Please contact patient with results and reschedule the appt.  I recommend the following for elevated creatinine:  Keep hydrated.  Avoid NSAIDs (ibuprofen, naproxen, aleve, advil, toradol or mobic)  Keep BP (goal <130/80) and cholesterol (LDL <100) within recommended goals  Low sodium diet encouraged; 2 grams per day  Increase exercise activity; 30 minutes 3-5 x per week   Maintain a healthy weight  Smoking cessation encouraged   Decrease ETOH intake/encouraged cessation   Medication compliance stressed   Keep all follow up appointments as scheduled

## 2024-10-11 ENCOUNTER — TELEPHONE (OUTPATIENT)
Dept: INFECTIOUS DISEASES | Facility: CLINIC | Age: 34
End: 2024-10-11
Payer: COMMERCIAL

## 2024-10-11 NOTE — TELEPHONE ENCOUNTER
----- Message from Maricel sent at 10/11/2024  1:14 PM CDT -----  Patient of Ariana    Patient called stating he usually get his medication for [facility] pharmacy. He stated [facility] informed him he has to fill the medication at his place of employment [facility].  He asked why?  Because he really does not want to go to [employer] [facility].        1:25 p    Please advise

## 2024-10-11 NOTE — TELEPHONE ENCOUNTER
I spoke to Ramo. Application reviewed over the phone. He will email me the required documentation to submit the application to [facility].

## 2024-10-11 NOTE — TELEPHONE ENCOUNTER
Called Kristine to check on this she stated pt has a high deductible - copay is [phone] and with coupon she added it was [phone]. She stated [facility] is wanting pt's to use their own pharmacy. She asked if pt can apply for [place] assistance.       Kristy can you assist pt with this?.

## 2024-10-16 ENCOUNTER — TELEPHONE (OUTPATIENT)
Dept: INFECTIOUS DISEASES | Facility: CLINIC | Age: 34
End: 2024-10-16
Payer: COMMERCIAL

## 2024-10-16 NOTE — TELEPHONE ENCOUNTER
----- Message from Nurse Fitzpatrick sent at 10/16/2024  9:35 AM CDT -----    ----- Message -----  From: Paige Parr  Sent: 10/16/2024   9:21 AM CDT  To: #    Patient of Ariana Wagner     Patient requesting call back regarding Biktarvy .    Patient needing financial assist with medication    Please contact when you are available     [phone] [id number]

## 2024-11-01 ENCOUNTER — LAB VISIT (OUTPATIENT)
Dept: LAB | Facility: HOSPITAL | Age: 34
End: 2024-11-01
Attending: NURSE PRACTITIONER
Payer: COMMERCIAL

## 2024-11-01 ENCOUNTER — OFFICE VISIT (OUTPATIENT)
Dept: INFECTIOUS DISEASES | Facility: CLINIC | Age: 34
End: 2024-11-01
Payer: COMMERCIAL

## 2024-11-01 VITALS
HEIGHT: 68 IN | HEART RATE: 73 BPM | SYSTOLIC BLOOD PRESSURE: 106 MMHG | WEIGHT: 165.81 LBS | TEMPERATURE: 99 F | DIASTOLIC BLOOD PRESSURE: 69 MMHG | RESPIRATION RATE: 14 BRPM | BODY MASS INDEX: 25.13 KG/M2

## 2024-11-01 DIAGNOSIS — Z11.3 ROUTINE SCREENING FOR STI (SEXUALLY TRANSMITTED INFECTION): ICD-10-CM

## 2024-11-01 DIAGNOSIS — B20 AIDS (ACQUIRED IMMUNODEFICIENCY SYNDROME), CD4 >200 AND <500: Primary | ICD-10-CM

## 2024-11-01 DIAGNOSIS — Z23 IMMUNIZATION DUE: ICD-10-CM

## 2024-11-01 DIAGNOSIS — Z86.19 HISTORY OF GONORRHEA: ICD-10-CM

## 2024-11-01 DIAGNOSIS — B20 AIDS (ACQUIRED IMMUNODEFICIENCY SYNDROME), CD4 >200 AND <500: ICD-10-CM

## 2024-11-01 LAB
ALBUMIN SERPL-MCNC: 3.9 G/DL (ref 3.5–5)
ALBUMIN/GLOB SERPL: 1.1 RATIO (ref 1.1–2)
ALP SERPL-CCNC: 79 UNIT/L (ref 40–150)
ALT SERPL-CCNC: 12 UNIT/L (ref 0–55)
ANION GAP SERPL CALC-SCNC: 5 MEQ/L
AST SERPL-CCNC: 19 UNIT/L (ref 5–34)
BASOPHILS # BLD AUTO: 0.02 X10(3)/MCL
BASOPHILS NFR BLD AUTO: 0.5 %
BILIRUB SERPL-MCNC: 0.2 MG/DL
BUN SERPL-MCNC: 12.8 MG/DL (ref 8.9–20.6)
C TRACH DNA SPEC QL NAA+PROBE: NOT DETECTED
CALCIUM SERPL-MCNC: 9.6 MG/DL (ref 8.4–10.2)
CHLORIDE SERPL-SCNC: 106 MMOL/L (ref 98–107)
CO2 SERPL-SCNC: 29 MMOL/L (ref 22–29)
CREAT SERPL-MCNC: 1.26 MG/DL (ref 0.72–1.25)
CREAT/UREA NIT SERPL: 10
EOSINOPHIL # BLD AUTO: 0.15 X10(3)/MCL (ref 0–0.9)
EOSINOPHIL NFR BLD AUTO: 3.9 %
ERYTHROCYTE [DISTWIDTH] IN BLOOD BY AUTOMATED COUNT: 13.6 % (ref 11.5–17)
GFR SERPLBLD CREATININE-BSD FMLA CKD-EPI: >60 ML/MIN/1.73/M2
GLOBULIN SER-MCNC: 3.6 GM/DL (ref 2.4–3.5)
GLUCOSE SERPL-MCNC: 89 MG/DL (ref 74–100)
HCT VFR BLD AUTO: 40.1 % (ref 42–52)
HGB BLD-MCNC: 13.5 G/DL (ref 14–18)
IMM GRANULOCYTES # BLD AUTO: 0.01 X10(3)/MCL (ref 0–0.04)
IMM GRANULOCYTES NFR BLD AUTO: 0.3 %
LYMPHOCYTES # BLD AUTO: 1.44 X10(3)/MCL (ref 0.6–4.6)
LYMPHOCYTES NFR BLD AUTO: 37.1 %
MCH RBC QN AUTO: 32.2 PG (ref 27–31)
MCHC RBC AUTO-ENTMCNC: 33.7 G/DL (ref 33–36)
MCV RBC AUTO: 95.7 FL (ref 80–94)
MONOCYTES # BLD AUTO: 0.54 X10(3)/MCL (ref 0.1–1.3)
MONOCYTES NFR BLD AUTO: 13.9 %
N GONORRHOEA DNA SPEC QL NAA+PROBE: NOT DETECTED
NEUTROPHILS # BLD AUTO: 1.72 X10(3)/MCL (ref 2.1–9.2)
NEUTROPHILS NFR BLD AUTO: 44.3 %
NRBC BLD AUTO-RTO: 0 %
PLATELET # BLD AUTO: 371 X10(3)/MCL (ref 130–400)
PMV BLD AUTO: 8.7 FL (ref 7.4–10.4)
POTASSIUM SERPL-SCNC: 4.1 MMOL/L (ref 3.5–5.1)
PROT SERPL-MCNC: 7.5 GM/DL (ref 6.4–8.3)
RBC # BLD AUTO: 4.19 X10(6)/MCL (ref 4.7–6.1)
SODIUM SERPL-SCNC: 140 MMOL/L (ref 136–145)
SOURCE (OHS): NORMAL
T PALLIDUM AB SER QL: NONREACTIVE
WBC # BLD AUTO: 3.88 X10(3)/MCL (ref 4.5–11.5)

## 2024-11-01 PROCEDURE — 80053 COMPREHEN METABOLIC PANEL: CPT

## 2024-11-01 PROCEDURE — 87591 N.GONORRHOEAE DNA AMP PROB: CPT | Performed by: NURSE PRACTITIONER

## 2024-11-01 PROCEDURE — 87491 CHLMYD TRACH DNA AMP PROBE: CPT | Performed by: NURSE PRACTITIONER

## 2024-11-01 PROCEDURE — 87536 HIV-1 QUANT&REVRSE TRNSCRPJ: CPT

## 2024-11-01 PROCEDURE — 86360 T CELL ABSOLUTE COUNT/RATIO: CPT

## 2024-11-01 PROCEDURE — 99213 OFFICE O/P EST LOW 20 MIN: CPT | Mod: PBBFAC | Performed by: NURSE PRACTITIONER

## 2024-11-01 PROCEDURE — 85025 COMPLETE CBC W/AUTO DIFF WBC: CPT

## 2024-11-01 PROCEDURE — 86780 TREPONEMA PALLIDUM: CPT

## 2024-11-01 PROCEDURE — 36415 COLL VENOUS BLD VENIPUNCTURE: CPT

## 2024-11-02 LAB — HIV1 RNA # PLAS NAA DL=20: 118 COPIES/ML

## 2024-11-03 LAB
CD3 CELLS # BLD: 1184 CELLS/MCL (ref 550–2202)
CD3 CELLS NFR BLD: 76 % (ref 58–86)
CD3+CD4+ CELLS # BLD: 437 CELLS/MCL (ref 365–1437)
CD3+CD4+ CELLS NFR BLD: 28 % (ref 32–64)
CD3+CD4+ CELLS/CD3+CD8+ CLL BLD: 0.7 %
CD3+CD8+ CELLS # BLD: 672 CELLS/MCL (ref 171–846)
CD3+CD8+ CELLS NFR BLD: 43 % (ref 15–40)
CD45 CELLS # BLD: 1.55 THOU/MCL (ref 0.82–2.84)

## 2024-11-04 ENCOUNTER — TELEPHONE (OUTPATIENT)
Dept: INFECTIOUS DISEASES | Facility: CLINIC | Age: 34
End: 2024-11-04
Payer: COMMERCIAL

## 2024-11-04 NOTE — TELEPHONE ENCOUNTER
----- Message from SANDRA Ortiz sent at 11/4/2024  9:56 AM CST -----  Reviewed labs. HIV VL is 118 and creatinine 1.26. Please contact patient with results and adherence counseling. He will need to take medication everyday at the same time. Take meds with food as this may help.  For renal, I recommend the following:  Keep hydrated.  Avoid NSAIDs (ibuprofen, naproxen, aleve, advil, toradol or mobic)  Keep BP (goal <130/80) and cholesterol (LDL <100) within recommended goals  Low sodium diet encouraged; 2 grams per day  Increase exercise activity; 30 minutes 3-5 x per week   Maintain a healthy weight  Smoking cessation encouraged   Decrease ETOH intake/encouraged cessation   Medication compliance stressed   Keep all follow up appointments as scheduled

## 2024-11-04 NOTE — PROGRESS NOTES
Reviewed labs. HIV VL is 118 and creatinine 1.26. Please contact patient with results and adherence counseling. He will need to take medication everyday at the same time. Take meds with food as this may help.  For renal, I recommend the following:  Keep hydrated.  Avoid NSAIDs (ibuprofen, naproxen, aleve, advil, toradol or mobic)  Keep BP (goal <130/80) and cholesterol (LDL <100) within recommended goals  Low sodium diet encouraged; 2 grams per day  Increase exercise activity; 30 minutes 3-5 x per week   Maintain a healthy weight  Smoking cessation encouraged   Decrease ETOH intake/encouraged cessation   Medication compliance stressed   Keep all follow up appointments as scheduled

## 2024-12-06 ENCOUNTER — OFFICE VISIT (OUTPATIENT)
Dept: OPHTHALMOLOGY | Facility: CLINIC | Age: 34
End: 2024-12-06
Payer: COMMERCIAL

## 2024-12-06 VITALS — WEIGHT: 165.38 LBS | BODY MASS INDEX: 25.07 KG/M2 | HEIGHT: 68 IN

## 2024-12-06 DIAGNOSIS — Z21 HIV INFECTION, UNSPECIFIED SYMPTOM STATUS: Primary | ICD-10-CM

## 2024-12-06 DIAGNOSIS — B20 AIDS (ACQUIRED IMMUNODEFICIENCY SYNDROME), CD4 <=200: ICD-10-CM

## 2024-12-06 DIAGNOSIS — H52.4 PRESBYOPIA OF BOTH EYES: ICD-10-CM

## 2024-12-06 PROCEDURE — 99213 OFFICE O/P EST LOW 20 MIN: CPT | Mod: PBBFAC,PN

## 2024-12-06 NOTE — PROGRESS NOTES
HPI    New Retinal exam, Dx. HIV    C/O eye strain, uses small handheld device at work with small labels.  Last edited by Jaja James RN on 12/6/2024 12:59 PM.      OCT RNFL:  12/6/2024  OD: 122, green N, rest white  OS: 120, Green N/T, rest white    OCT Mac:  12/6/2024  222//222, preserved FC, no IRF/SRF OU    Assessment /Plan     For exam results, see Encounter Report.    AIDS (acquired immunodeficiency syndrome), CD4 <=200  -     Ambulatory referral/consult to Ophthalmology      1. 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 Early Presbyopia  - Since 1/2024, having trouble looking at small lables  - Near VAsc 20/25 OU. Near VAcc 20/20 OU  - Recommend over-the-counter reading glasses +0.75 or +1.00    Return 1 year for DFE, OCT mac, optos

## 2025-01-02 ENCOUNTER — CLINICAL SUPPORT (OUTPATIENT)
Dept: INFECTIOUS DISEASES | Facility: CLINIC | Age: 35
End: 2025-01-02
Payer: COMMERCIAL

## 2025-01-02 DIAGNOSIS — Z23 IMMUNIZATION DUE: Primary | ICD-10-CM

## 2025-01-02 PROCEDURE — 90651 9VHPV VACCINE 2/3 DOSE IM: CPT | Mod: PBBFAC

## 2025-01-02 PROCEDURE — 99211 OFF/OP EST MAY X REQ PHY/QHP: CPT | Mod: PBBFAC

## 2025-01-02 PROCEDURE — 90471 IMMUNIZATION ADMIN: CPT | Mod: PBBFAC

## 2025-01-02 RX ADMIN — HUMAN PAPILLOMAVIRUS 9-VALENT VACCINE, RECOMBINANT 0.5 ML: 30; 40; 60; 40; 20; 20; 20; 20; 20 INJECTION, SUSPENSION INTRAMUSCULAR at 09:01

## 2025-01-02 NOTE — PROGRESS NOTES
Gardasil number 2 given to left deltoid using aseptic technique, tolerated well.  Patient provided with date of next injection (5/1/25 at 9:30am), instructed to contact clinic should need arise.

## 2025-02-17 DIAGNOSIS — B20 AIDS (ACQUIRED IMMUNODEFICIENCY SYNDROME), CD4 >200 AND <500: ICD-10-CM

## 2025-02-21 ENCOUNTER — LAB VISIT (OUTPATIENT)
Dept: LAB | Facility: HOSPITAL | Age: 35
End: 2025-02-21
Attending: NURSE PRACTITIONER
Payer: COMMERCIAL

## 2025-02-21 ENCOUNTER — OFFICE VISIT (OUTPATIENT)
Dept: INFECTIOUS DISEASES | Facility: CLINIC | Age: 35
End: 2025-02-21
Payer: COMMERCIAL

## 2025-02-21 VITALS
TEMPERATURE: 99 F | HEIGHT: 68 IN | DIASTOLIC BLOOD PRESSURE: 77 MMHG | SYSTOLIC BLOOD PRESSURE: 117 MMHG | BODY MASS INDEX: 25.23 KG/M2 | RESPIRATION RATE: 18 BRPM | HEART RATE: 63 BPM | WEIGHT: 166.44 LBS

## 2025-02-21 DIAGNOSIS — B20 HIV DISEASE: Primary | ICD-10-CM

## 2025-02-21 DIAGNOSIS — B20 HIV DISEASE: ICD-10-CM

## 2025-02-21 DIAGNOSIS — Z86.19 HISTORY OF GONORRHEA: ICD-10-CM

## 2025-02-21 DIAGNOSIS — F17.210 CIGARETTE NICOTINE DEPENDENCE WITHOUT COMPLICATION: ICD-10-CM

## 2025-02-21 DIAGNOSIS — F31.9 BIPOLAR AFFECTIVE DISORDER, REMISSION STATUS UNSPECIFIED: ICD-10-CM

## 2025-02-21 LAB
ALBUMIN SERPL-MCNC: 4.7 G/DL (ref 3.5–5)
ALBUMIN/GLOB SERPL: 1.2 RATIO (ref 1.1–2)
ALP SERPL-CCNC: 77 UNIT/L (ref 40–150)
ALT SERPL-CCNC: 17 UNIT/L (ref 0–55)
ANION GAP SERPL CALC-SCNC: 4 MEQ/L
AST SERPL-CCNC: 34 UNIT/L (ref 5–34)
BASOPHILS # BLD AUTO: 0.03 X10(3)/MCL
BASOPHILS NFR BLD AUTO: 0.5 %
BILIRUB SERPL-MCNC: 0.6 MG/DL
BUN SERPL-MCNC: 11.7 MG/DL (ref 8.9–20.6)
CALCIUM SERPL-MCNC: 10.1 MG/DL (ref 8.4–10.2)
CHLORIDE SERPL-SCNC: 108 MMOL/L (ref 98–107)
CO2 SERPL-SCNC: 28 MMOL/L (ref 22–29)
CREAT SERPL-MCNC: 1.23 MG/DL (ref 0.72–1.25)
CREAT/UREA NIT SERPL: 10
EOSINOPHIL # BLD AUTO: 0.05 X10(3)/MCL (ref 0–0.9)
EOSINOPHIL NFR BLD AUTO: 0.8 %
ERYTHROCYTE [DISTWIDTH] IN BLOOD BY AUTOMATED COUNT: 12.4 % (ref 11.5–17)
GFR SERPLBLD CREATININE-BSD FMLA CKD-EPI: >60 ML/MIN/1.73/M2
GLOBULIN SER-MCNC: 3.8 GM/DL (ref 2.4–3.5)
GLUCOSE SERPL-MCNC: 84 MG/DL (ref 74–100)
HCT VFR BLD AUTO: 39.4 % (ref 42–52)
HGB BLD-MCNC: 13.2 G/DL (ref 14–18)
IMM GRANULOCYTES # BLD AUTO: 0.01 X10(3)/MCL (ref 0–0.04)
IMM GRANULOCYTES NFR BLD AUTO: 0.2 %
LYMPHOCYTES # BLD AUTO: 2.25 X10(3)/MCL (ref 0.6–4.6)
LYMPHOCYTES NFR BLD AUTO: 34.5 %
MCH RBC QN AUTO: 32.7 PG (ref 27–31)
MCHC RBC AUTO-ENTMCNC: 33.5 G/DL (ref 33–36)
MCV RBC AUTO: 97.5 FL (ref 80–94)
MONOCYTES # BLD AUTO: 0.65 X10(3)/MCL (ref 0.1–1.3)
MONOCYTES NFR BLD AUTO: 10 %
NEUTROPHILS # BLD AUTO: 3.53 X10(3)/MCL (ref 2.1–9.2)
NEUTROPHILS NFR BLD AUTO: 54 %
NRBC BLD AUTO-RTO: 0 %
PLATELET # BLD AUTO: 409 X10(3)/MCL (ref 130–400)
PMV BLD AUTO: 8.9 FL (ref 7.4–10.4)
POTASSIUM SERPL-SCNC: 4.6 MMOL/L (ref 3.5–5.1)
PROT SERPL-MCNC: 8.5 GM/DL (ref 6.4–8.3)
RBC # BLD AUTO: 4.04 X10(6)/MCL (ref 4.7–6.1)
SODIUM SERPL-SCNC: 140 MMOL/L (ref 136–145)
WBC # BLD AUTO: 6.52 X10(3)/MCL (ref 4.5–11.5)

## 2025-02-21 PROCEDURE — 80053 COMPREHEN METABOLIC PANEL: CPT

## 2025-02-21 PROCEDURE — 99214 OFFICE O/P EST MOD 30 MIN: CPT | Mod: PBBFAC | Performed by: NURSE PRACTITIONER

## 2025-02-21 PROCEDURE — 36415 COLL VENOUS BLD VENIPUNCTURE: CPT

## 2025-02-21 PROCEDURE — 86361 T CELL ABSOLUTE COUNT: CPT

## 2025-02-21 PROCEDURE — 87536 HIV-1 QUANT&REVRSE TRNSCRPJ: CPT

## 2025-02-21 PROCEDURE — 85025 COMPLETE CBC W/AUTO DIFF WBC: CPT

## 2025-02-21 NOTE — PROGRESS NOTES
Patient ID: Ramo Aguirre 34 y.o.     Chief Complaint:   Chief Complaint   Patient presents with    Follow-up     B20        HPI:    2/21/25  Ramo Aguirre is a 33 y/o AAM HIV patient here for HIV f/u. Dx 7/10/24. HLA  neg, G6PD deficiency.  MSM, versatile.  Pt reports adherence to Biktarvy. States he is tolerating the meds well. Past HIV GT 7/24/24 NNRTI NESSA K103N with resistance to Sustiva. No other Richard or resistance noted.  Pt denies fever, headache, chills, visual problems, N/V/D, SOB, cough, chest pain or abd pain. Reviewed labs from 11/1/24 HIV  and CD4 437.  STI screening negative 11/1/24.  Pt attended eye clinic 12/6/24.  Pt smokes 1 pack of cigarettes per day. Cessation encouraged.      11/1/24  Ramo Aguirre is a 34 y/o AAM HIV patient here for HIV f/u.  Dx 7/10/24, no resistance on HIV GT. Family members are unaware of his dx.  MSM, versatile. Pt reports adherence to Biktarvy. States he did miss about 1 week about 3 weeks ago due to an issue with his insurance. This has since been addressed and he has been taking his meds without missing any doses. States he is tolerating meds well. Denies fever, headache, chills, visual problems, N/V/D, SOB, cough, chest pain or abd pain. Reviewed labs from 9/16/24 HIV  and CD4 338. 8/5/24 Rectal gonorrhea test positive. Labs and STI screening will need to be updated.  He has an eye appt scheduled for 12/6/24.  Pt is due for a Tdap and Gardasil 9 #1. He is amenable.     9/16/24  Ramo Aguirre is a 34 y/o AAM HIV patient here for HIV f/u visit. Dx 7/10/24.  Family members are unaware of his dx.  MSM, versatile. Pt reports 100% adherence to Biktarvy since starting medication last month. He is tolerating the meds well. Denies fever, headache, chills, visual problems, N/V/D, SOB, cough, chest pain or abd pain. Reviewed labs from 7/24/24 HIV VL 1.22 million units (K103N), CD4 199/17%, syphilis ab NR, QG neg, crypto neg, Hep A IGG +, Hep B s ab +, and  creatinine 1.22.  Pt is scheduled to see the eye doctor 11/26/24.  He is due for a flu and PCV 20. Pt is amenable.       8/5/24  Ramo Aguirre is a 32 y/o AAM HIV patient here for initial HIV visit. Dx 7/10/24. Family members are unaware of his dx.  MSM, versatile.  Pt states he has had >70 partners some with condoms and some without condoms.  He has approx 10 tattoos. Denies alcohol, but states he smokes THC twice daily. Smokes 1 ppd of cigarettes.  Pt denies fever, headache, chills, visual problems, N/V/D, SOB, cough, chest pain or abd pain. Complains of thrush that comes and goes. Reviewed labs from 7/24/24 HIV VL 1.22 million units (K103N), CD4 199/17%, syphilis ab NR, QG neg, crypto neg, Hep A IGG +, Hep B s ab +, and creatinine 1.22. Pt will need STI screening and an eye exam.  I will send referral for eye clinic.             Past Medical History:   Diagnosis Date    Bipolar disorder, unspecified     HIV infection         History reviewed. No pertinent surgical history.     Social History     Socioeconomic History    Marital status: Single   Tobacco Use    Smoking status: Every Day     Current packs/day: 1.00     Types: Cigarettes    Smokeless tobacco: Never   Substance and Sexual Activity    Alcohol use: Never    Drug use: Yes     Types: Marijuana    Sexual activity: Yes     Partners: Male        Family History   Problem Relation Name Age of Onset    Diabetes Maternal Grandmother      Glaucoma Maternal Grandmother      Cataracts Maternal Grandmother      Cancer Maternal Grandmother          Review of patient's allergies indicates:  No Known Allergies     Immunization History   Administered Date(s) Administered    COVID-19, MRNA, LN-S, PF (Pfizer) (Purple Cap) 10/12/2021, 11/09/2021    DTaP 1991, 02/17/1992, 04/16/1992, 10/01/1993, 10/05/1995    HIB 1991, 02/17/1992, 04/16/1992, 01/05/1993    HPV 9-Valent 11/01/2024, 01/02/2025    Hepatitis A, Adult 12/07/2016, 08/16/2017    Hepatitis B, Adult  "11/22/2005, 03/23/2006    IPV 1991, 02/17/1992, 10/01/1993, 10/05/1995    Influenza 11/22/2005    Influenza - Quadrivalent 11/21/2016    Influenza - Trivalent - Fluarix, Flulaval, Fluzone, Afluria - PF 09/16/2024    MMR 10/01/1993, 10/05/1995    Meningococcal Conjugate (MCV4P) 07/14/2009    Pneumococcal Conjugate - 20 Valent 09/16/2024    Td (ADULT) 11/22/2005    Tdap 11/01/2024    Varicella 07/12/2000, 07/14/2009        Review of Systems   Constitutional: Negative.    HENT: Negative.     Eyes: Negative.    Respiratory: Negative.     Cardiovascular: Negative.    Gastrointestinal: Negative.    Genitourinary: Negative.    Musculoskeletal: Negative.    Skin: Negative.    Neurological: Negative.    Endo/Heme/Allergies: Negative.    Psychiatric/Behavioral: Negative.     All other systems reviewed and are negative.         Objective:      /77   Pulse 63   Temp 98.7 °F (37.1 °C) (Oral)   Resp 18   Ht 5' 8" (1.727 m)   Wt 75.5 kg (166 lb 7.2 oz)   BMI 25.31 kg/m²      Physical Exam  Vitals reviewed.   Constitutional:       General: He is not in acute distress.     Appearance: Normal appearance.   HENT:      Head: Normocephalic.      Right Ear: Tympanic membrane, ear canal and external ear normal.      Left Ear: Tympanic membrane, ear canal and external ear normal.      Nose: Nose normal.      Mouth/Throat:      Mouth: Mucous membranes are moist.      Pharynx: Oropharynx is clear.   Eyes:      General: No scleral icterus.     Extraocular Movements: Extraocular movements intact.      Conjunctiva/sclera: Conjunctivae normal.      Pupils: Pupils are equal, round, and reactive to light.   Cardiovascular:      Rate and Rhythm: Normal rate and regular rhythm.      Pulses: Normal pulses.      Heart sounds: Normal heart sounds.   Pulmonary:      Effort: Pulmonary effort is normal. No respiratory distress.      Breath sounds: Normal breath sounds.   Abdominal:      General: Bowel sounds are normal. There is no " distension.      Palpations: Abdomen is soft. There is no mass.      Tenderness: There is no abdominal tenderness. There is no right CVA tenderness or left CVA tenderness.      Hernia: No hernia is present.   Musculoskeletal:         General: No tenderness or signs of injury. Normal range of motion.      Cervical back: Normal range of motion and neck supple.      Right lower leg: No edema.      Left lower leg: No edema.   Lymphadenopathy:      Cervical: No cervical adenopathy.   Skin:     General: Skin is warm and dry.      Capillary Refill: Capillary refill takes less than 2 seconds.      Findings: No erythema or lesion.   Neurological:      General: No focal deficit present.      Mental Status: He is alert and oriented to person, place, and time. Mental status is at baseline.   Psychiatric:         Mood and Affect: Mood normal.         Behavior: Behavior normal.         Thought Content: Thought content normal.         Judgment: Judgment normal.          Labs:   Lab Results   Component Value Date    WBC 6.52 02/21/2025    HGB 13.2 (L) 02/21/2025    HCT 39.4 (L) 02/21/2025    MCV 97.5 (H) 02/21/2025     (H) 02/21/2025       CMP  Sodium   Date Value Ref Range Status   11/01/2024 140 136 - 145 mmol/L Final     Potassium   Date Value Ref Range Status   11/01/2024 4.1 3.5 - 5.1 mmol/L Final     Chloride   Date Value Ref Range Status   11/01/2024 106 98 - 107 mmol/L Final     CO2   Date Value Ref Range Status   11/01/2024 29 22 - 29 mmol/L Final     Blood Urea Nitrogen   Date Value Ref Range Status   11/01/2024 12.8 8.9 - 20.6 mg/dL Final     Creatinine   Date Value Ref Range Status   11/01/2024 1.26 (H) 0.72 - 1.25 mg/dL Final     Calcium   Date Value Ref Range Status   11/01/2024 9.6 8.4 - 10.2 mg/dL Final     Albumin   Date Value Ref Range Status   11/01/2024 3.9 3.5 - 5.0 g/dL Final     Bilirubin Total   Date Value Ref Range Status   11/01/2024 0.2 <=1.5 mg/dL Final     ALP   Date Value Ref Range Status    11/01/2024 79 40 - 150 unit/L Final     AST   Date Value Ref Range Status   11/01/2024 19 5 - 34 unit/L Final     ALT   Date Value Ref Range Status   11/01/2024 12 0 - 55 unit/L Final     eGFR   Date Value Ref Range Status   11/01/2024 >60 mL/min/1.73/m2 Final     Lab Results   Component Value Date    TSH 1.286 07/24/2024     Hep C Ab Interp   Date Value Ref Range Status   07/24/2024 Nonreactive Nonreactive Final     Syphilis Antibody   Date Value Ref Range Status   11/01/2024 Nonreactive Nonreactive, Equivocal Final     RPR   Date Value Ref Range Status   07/19/2017 Non reactive  Final     Cholesterol Total   Date Value Ref Range Status   07/24/2024 149 <=200 mg/dL Final     HDL Cholesterol   Date Value Ref Range Status   07/24/2024 36 35 - 60 mg/dL Final     Triglyceride   Date Value Ref Range Status   07/24/2024 69 34 - 140 mg/dL Final     Cholesterol/HDL Ratio   Date Value Ref Range Status   07/24/2024 4 0 - 5 Final     Very Low Density Lipoprotein   Date Value Ref Range Status   07/24/2024 14  Final     LDL Cholesterol   Date Value Ref Range Status   07/24/2024 99.00 50.00 - 140.00 mg/dL Final     Vitamin D   Date Value Ref Range Status   07/24/2024 68 30 - 80 ng/mL Final     No results found for this or any previous visit.  Results for orders placed or performed in visit on 11/01/24   HIV-1 RNA, Quantitative, PCR with Reflex to Genotype   Result Value Ref Range    HIV-1 RNA Detect/Quant, P 118 (A) Undetected copies/mL     Results for orders placed or performed in visit on 07/24/24   Quantiferon Gold TB   Result Value Ref Range    QuantiFERON-Tb Gold Plus Result Negative Negative    TB1 Ag minus Nil Result -0.06 IU/mL    TB2 Ag minus Nil Result -0.06 IU/mL    Mitogen minus Nil Result >10.00 IU/mL    Nil Result 0.13 IU/mL     Results for orders placed or performed in visit on 11/01/24   C.trach/N.gonor AMP RNA    Specimen: Throat   Result Value Ref Range    Source THROAT     Chlamydia trachomatis amplified RNA  Negative Negative    Neisseria gonorrhoeae amplified RNA Negative Negative    SOURCE: THROAT      Results for orders placed or performed in visit on 07/24/24   Urinalysis   Result Value Ref Range    Color, UA Yellow Yellow, Light-Yellow, Dark Yellow, [place], Straw    Appearance, UA Clear Clear    Specific Gravity, UA 1.025 1.005 - 1.030    pH, UA 7.5 5.0 - 8.5    Protein, UA Trace (A) Negative    Glucose, UA Normal Negative, Normal    Ketones, UA Negative Negative    Blood, UA Negative Negative    Bilirubin, UA Negative Negative    Urobilinogen, UA 1+ (A) 0.2, 1.0, Normal    Nitrites, UA Negative Negative    Leukocyte Esterase, UA 75 (A) Negative    RBC, UA 0-5 None Seen, 0-2, 3-5, 0-5 /HPF    WBC, UA 11-20 (A) None Seen, 0-2, 3-5, 0-5 /HPF    Bacteria, UA None Seen None Seen /HPF    Squamous Epithelial Cells, UA None Seen None Seen /HPF    Mucous, UA Trace (A) None Seen /LPF    Hyaline Casts, UA None Seen None Seen /lpf       Imaging: Reviewed most recent relevant imaging studies available, notable results highlighted in this note    Medications:     Current Outpatient Medications   Medication Instructions    zeszawftn-kkrrelrw-xkzcgxg ala (BIKTARVY) -25 mg (25 kg or greater) 1 tablet, Oral, Daily    buPROPion (WELLBUTRIN XL) 300 mg    divalproex (DEPAKOTE) 500 mg, Daily       Assessment:       Problem List Items Addressed This Visit    None  Visit Diagnoses         HIV disease    -  Primary    Relevant Medications    evashzekv-hgrfeslo-oezjymy ala (BIKTARVY) -25 mg (25 kg or greater)    Other Relevant Orders    HIV-1 RNA, Quantitative, PCR with Reflex to Genotype    CBC Auto Differential (Completed)    Comprehensive Metabolic Panel    CD4 Lymphocytes    HIV-1 RNA, Quantitative, PCR with Reflex to Genotype    CBC Auto Differential    Comprehensive Metabolic Panel      History of gonorrhea          Bipolar affective disorder, remission status unspecified          Cigarette nicotine dependence without  complication                   Plan:      HIV disease  -     msyjcetor-pwigljoc-qlbtrmb ala (BIKTARVY) -25 mg (25 kg or greater); Take 1 tablet by mouth once daily.  Dispense: 30 tablet; Refill: 4  -     HIV-1 RNA, Quantitative, PCR with Reflex to Genotype; Future; Expected date: 02/21/2025  -     CBC Auto Differential; Future; Expected date: 02/21/2025  -     Comprehensive Metabolic Panel; Future; Expected date: 02/21/2025  -     CD4 Lymphocytes; Future; Expected date: 02/21/2025  -     HIV-1 RNA, Quantitative, PCR with Reflex to Genotype; Future; Expected date: 05/21/2025  -     CBC Auto Differential; Future; Expected date: 05/21/2025  -     Comprehensive Metabolic Panel; Future; Expected date: 05/21/2025  Dx 7/10/24  Initial HIV VL 4,500,000 and CD4 alejandra 199/17% on 7/24/24  OI hx: none     Adherence and sexual health counseling done.  Use condoms for all sexual encounters.  Blood precautions.   Continue Biktarvy as prescribed.  Labs today and 1 week before next visit   RTC 3 months with Ariana for a virtual visit      HIV Wellness:  Anal pap: defer until age 35  Oral CT/GC: 11/1/24  Anal CT/GC:11/1/24  Urine CT/GC: 8/5/24  RPR: 11/1/24  Ophth: 12/6/24  DEXA: defer until age 50  Colon Cancer Screen: defer until age 45  PSA: defer until age 50  Vaccines recommended     G6PD deficiency   Avoid steve beans, ASA, and Sulfonamides (Dapsone, Macrobid, Primaquine, etc).     History of gonorrhea    Bipolar affective disorder, remission status unspecified  Keep all appts with mental health     Cigarette nicotine dependence without complication  Spent approx 3 minutes discussing smoking cessation   Pt is not ready to quit.  Discussed benefits of quitting: improved overall health, decreased cardiac/vascular/pulmonary/stroke risks, as well as cost savings.         30 minutes of total time spent on the encounter, which includes face to face time and non-face to face time preparing to see the patient (eg, review of tests),  Obtaining and/or reviewing separately obtained history, Documenting clinical information in the electronic or other health record, Independently interpreting results (not separately reported) and communicating results to the patient/family/caregiver, or Care coordination (not separately reported).

## 2025-02-24 LAB
AGE: 33
CD3+CD4+ CELLS # SPEC: 575 UNIT/L (ref 589–1505)
CD3+CD4+ CELLS NFR BLD: 25.2 %
HIV1 RNA # PLAS NAA DL=20: 302 COPIES/ML
LYMPHOCYTES # BLD AUTO: 2282 X10(3)/MCL (ref 1260–5520)
LYMPHOCYTES NFR LN MANUAL: 35 % (ref 28–48)
LYMPHOMA - T-CELL MARKERS SPEC-IMP: ABNORMAL
WBC # BLD AUTO: 6520 /MM3 (ref 4500–11500)

## 2025-02-25 ENCOUNTER — RESULTS FOLLOW-UP (OUTPATIENT)
Dept: INFECTIOUS DISEASES | Facility: CLINIC | Age: 35
End: 2025-02-25

## 2025-02-26 NOTE — TELEPHONE ENCOUNTER
----- Message from SANDRA Ortiz sent at 2/25/2025  6:14 PM CST -----  Reviewed labs. HIV VL is elevated at 302.  Please contact patient with results and adherence counseling.  If patient is not taking meds, encourage them to take meds daily at the same time.    ----- Message -----  From: Lab, Background User  Sent: 2/21/2025  11:17 AM CST  To: SANDRA Gerardo

## 2025-02-26 NOTE — PROGRESS NOTES
Reviewed labs. HIV VL is elevated at 302.  Please contact patient with results and adherence counseling.  If patient is not taking meds, encourage them to take meds daily at the same time.

## 2025-05-01 ENCOUNTER — CLINICAL SUPPORT (OUTPATIENT)
Dept: INFECTIOUS DISEASES | Facility: CLINIC | Age: 35
End: 2025-05-01
Payer: COMMERCIAL

## 2025-05-01 DIAGNOSIS — Z23 IMMUNIZATION DUE: Primary | ICD-10-CM

## 2025-05-01 PROCEDURE — 90471 IMMUNIZATION ADMIN: CPT | Mod: PBBFAC

## 2025-05-01 PROCEDURE — 90651 9VHPV VACCINE 2/3 DOSE IM: CPT | Mod: PBBFAC

## 2025-05-01 PROCEDURE — 99211 OFF/OP EST MAY X REQ PHY/QHP: CPT | Mod: PBBFAC

## 2025-05-01 RX ADMIN — HUMAN PAPILLOMAVIRUS 9-VALENT VACCINE, RECOMBINANT 0.5 ML: 30; 40; 60; 40; 20; 20; 20; 20; 20 INJECTION, SUSPENSION INTRAMUSCULAR at 09:05

## 2025-05-29 ENCOUNTER — LAB VISIT (OUTPATIENT)
Dept: LAB | Facility: HOSPITAL | Age: 35
End: 2025-05-29
Attending: NURSE PRACTITIONER
Payer: COMMERCIAL

## 2025-05-29 DIAGNOSIS — B20 HIV DISEASE: ICD-10-CM

## 2025-05-29 LAB
ALBUMIN SERPL-MCNC: 3.9 G/DL (ref 3.5–5)
ALBUMIN/GLOB SERPL: 1.2 RATIO (ref 1.1–2)
ALP SERPL-CCNC: 78 UNIT/L (ref 40–150)
ALT SERPL-CCNC: 13 UNIT/L (ref 0–55)
ANION GAP SERPL CALC-SCNC: 6 MEQ/L
AST SERPL-CCNC: 21 UNIT/L (ref 11–45)
BASOPHILS # BLD AUTO: 0.02 X10(3)/MCL
BASOPHILS NFR BLD AUTO: 0.3 %
BILIRUB SERPL-MCNC: 0.3 MG/DL
BUN SERPL-MCNC: 9 MG/DL (ref 8.9–20.6)
CALCIUM SERPL-MCNC: 9.1 MG/DL (ref 8.4–10.2)
CHLORIDE SERPL-SCNC: 107 MMOL/L (ref 98–107)
CO2 SERPL-SCNC: 27 MMOL/L (ref 22–29)
CREAT SERPL-MCNC: 1.16 MG/DL (ref 0.72–1.25)
CREAT/UREA NIT SERPL: 8
EOSINOPHIL # BLD AUTO: 0.06 X10(3)/MCL (ref 0–0.9)
EOSINOPHIL NFR BLD AUTO: 1 %
ERYTHROCYTE [DISTWIDTH] IN BLOOD BY AUTOMATED COUNT: 11.9 % (ref 11.5–17)
GFR SERPLBLD CREATININE-BSD FMLA CKD-EPI: >60 ML/MIN/1.73/M2
GLOBULIN SER-MCNC: 3.3 GM/DL (ref 2.4–3.5)
GLUCOSE SERPL-MCNC: 116 MG/DL (ref 74–100)
HCT VFR BLD AUTO: 36.2 % (ref 42–52)
HGB BLD-MCNC: 12.4 G/DL (ref 14–18)
IMM GRANULOCYTES # BLD AUTO: 0.01 X10(3)/MCL (ref 0–0.04)
IMM GRANULOCYTES NFR BLD AUTO: 0.2 %
LYMPHOCYTES # BLD AUTO: 1.59 X10(3)/MCL (ref 0.6–4.6)
LYMPHOCYTES NFR BLD AUTO: 27.6 %
MCH RBC QN AUTO: 33.2 PG (ref 27–31)
MCHC RBC AUTO-ENTMCNC: 34.3 G/DL (ref 33–36)
MCV RBC AUTO: 97.1 FL (ref 80–94)
MONOCYTES # BLD AUTO: 0.42 X10(3)/MCL (ref 0.1–1.3)
MONOCYTES NFR BLD AUTO: 7.3 %
NEUTROPHILS # BLD AUTO: 3.67 X10(3)/MCL (ref 2.1–9.2)
NEUTROPHILS NFR BLD AUTO: 63.6 %
NRBC BLD AUTO-RTO: 0 %
PLATELET # BLD AUTO: 333 X10(3)/MCL (ref 130–400)
PMV BLD AUTO: 8.9 FL (ref 7.4–10.4)
POTASSIUM SERPL-SCNC: 3.3 MMOL/L (ref 3.5–5.1)
PROT SERPL-MCNC: 7.2 GM/DL (ref 6.4–8.3)
RBC # BLD AUTO: 3.73 X10(6)/MCL (ref 4.7–6.1)
SODIUM SERPL-SCNC: 140 MMOL/L (ref 136–145)
WBC # BLD AUTO: 5.77 X10(3)/MCL (ref 4.5–11.5)

## 2025-05-29 PROCEDURE — 80053 COMPREHEN METABOLIC PANEL: CPT

## 2025-05-29 PROCEDURE — 36415 COLL VENOUS BLD VENIPUNCTURE: CPT

## 2025-05-29 PROCEDURE — 85025 COMPLETE CBC W/AUTO DIFF WBC: CPT

## 2025-05-29 PROCEDURE — 87536 HIV-1 QUANT&REVRSE TRNSCRPJ: CPT

## 2025-05-31 LAB — HIV1 RNA # PLAS NAA DL=20: 232 COPIES/ML

## 2025-06-03 ENCOUNTER — PATIENT MESSAGE (OUTPATIENT)
Dept: INFECTIOUS DISEASES | Facility: CLINIC | Age: 35
End: 2025-06-03

## 2025-06-03 ENCOUNTER — TELEPHONE (OUTPATIENT)
Dept: INFECTIOUS DISEASES | Facility: CLINIC | Age: 35
End: 2025-06-03

## 2025-06-03 ENCOUNTER — OFFICE VISIT (OUTPATIENT)
Dept: INFECTIOUS DISEASES | Facility: CLINIC | Age: 35
End: 2025-06-03
Payer: COMMERCIAL

## 2025-06-03 DIAGNOSIS — F31.9 BIPOLAR AFFECTIVE DISORDER, REMISSION STATUS UNSPECIFIED: ICD-10-CM

## 2025-06-03 DIAGNOSIS — D64.9 ANEMIA, UNSPECIFIED TYPE: ICD-10-CM

## 2025-06-03 DIAGNOSIS — Z21 HIV INFECTION, UNSPECIFIED SYMPTOM STATUS: Primary | ICD-10-CM

## 2025-06-03 DIAGNOSIS — E55.9 VITAMIN D DEFICIENCY: ICD-10-CM

## 2025-06-03 PROCEDURE — 1160F RVW MEDS BY RX/DR IN RCRD: CPT | Mod: CPTII,95,, | Performed by: NURSE PRACTITIONER

## 2025-06-03 PROCEDURE — 98006 SYNCH AUDIO-VIDEO EST MOD 30: CPT | Mod: 95,,, | Performed by: NURSE PRACTITIONER

## 2025-06-03 PROCEDURE — 1159F MED LIST DOCD IN RCRD: CPT | Mod: CPTII,95,, | Performed by: NURSE PRACTITIONER

## 2025-06-04 ENCOUNTER — RESULTS FOLLOW-UP (OUTPATIENT)
Dept: INFECTIOUS DISEASES | Facility: CLINIC | Age: 35
End: 2025-06-04

## 2025-06-04 ENCOUNTER — LAB VISIT (OUTPATIENT)
Dept: LAB | Facility: HOSPITAL | Age: 35
End: 2025-06-04
Attending: NURSE PRACTITIONER
Payer: COMMERCIAL

## 2025-06-04 DIAGNOSIS — Z21 HIV INFECTION, UNSPECIFIED SYMPTOM STATUS: ICD-10-CM

## 2025-06-04 DIAGNOSIS — D64.9 ANEMIA, UNSPECIFIED TYPE: ICD-10-CM

## 2025-06-04 LAB
ALBUMIN SERPL-MCNC: 4.1 G/DL (ref 3.5–5)
ALBUMIN/GLOB SERPL: 1.1 RATIO (ref 1.1–2)
ALP SERPL-CCNC: 73 UNIT/L (ref 40–150)
ALT SERPL-CCNC: 10 UNIT/L (ref 0–55)
ANION GAP SERPL CALC-SCNC: 8 MEQ/L
AST SERPL-CCNC: 18 UNIT/L (ref 11–45)
BASOPHILS # BLD AUTO: 0.02 X10(3)/MCL
BASOPHILS NFR BLD AUTO: 0.4 %
BILIRUB SERPL-MCNC: 0.7 MG/DL
BUN SERPL-MCNC: 14.6 MG/DL (ref 8.9–20.6)
CALCIUM SERPL-MCNC: 9.4 MG/DL (ref 8.4–10.2)
CHLORIDE SERPL-SCNC: 108 MMOL/L (ref 98–107)
CO2 SERPL-SCNC: 26 MMOL/L (ref 22–29)
CREAT SERPL-MCNC: 1.15 MG/DL (ref 0.72–1.25)
CREAT/UREA NIT SERPL: 13
EOSINOPHIL # BLD AUTO: 0.04 X10(3)/MCL (ref 0–0.9)
EOSINOPHIL NFR BLD AUTO: 0.8 %
ERYTHROCYTE [DISTWIDTH] IN BLOOD BY AUTOMATED COUNT: 11.9 % (ref 11.5–17)
FERRITIN SERPL-MCNC: 25.82 NG/ML (ref 21.81–274.66)
GFR SERPLBLD CREATININE-BSD FMLA CKD-EPI: >60 ML/MIN/1.73/M2
GLOBULIN SER-MCNC: 3.6 GM/DL (ref 2.4–3.5)
GLUCOSE SERPL-MCNC: 88 MG/DL (ref 74–100)
HCT VFR BLD AUTO: 36.9 % (ref 42–52)
HGB BLD-MCNC: 12.7 G/DL (ref 14–18)
IMM GRANULOCYTES # BLD AUTO: 0.01 X10(3)/MCL (ref 0–0.04)
IMM GRANULOCYTES NFR BLD AUTO: 0.2 %
IRON SATN MFR SERPL: 32 % (ref 20–50)
IRON SERPL-MCNC: 103 UG/DL (ref 65–175)
LYMPHOCYTES # BLD AUTO: 1.75 X10(3)/MCL (ref 0.6–4.6)
LYMPHOCYTES NFR BLD AUTO: 35.5 %
MCH RBC QN AUTO: 33.2 PG (ref 27–31)
MCHC RBC AUTO-ENTMCNC: 34.4 G/DL (ref 33–36)
MCV RBC AUTO: 96.3 FL (ref 80–94)
MONOCYTES # BLD AUTO: 0.35 X10(3)/MCL (ref 0.1–1.3)
MONOCYTES NFR BLD AUTO: 7.1 %
NEUTROPHILS # BLD AUTO: 2.76 X10(3)/MCL (ref 2.1–9.2)
NEUTROPHILS NFR BLD AUTO: 56 %
NRBC BLD AUTO-RTO: 0 %
PLATELET # BLD AUTO: 328 X10(3)/MCL (ref 130–400)
PMV BLD AUTO: 9.3 FL (ref 7.4–10.4)
POTASSIUM SERPL-SCNC: 3.4 MMOL/L (ref 3.5–5.1)
PROT SERPL-MCNC: 7.7 GM/DL (ref 6.4–8.3)
RBC # BLD AUTO: 3.83 X10(6)/MCL (ref 4.7–6.1)
RET# (OHS): 0.06 X10E6/UL (ref 0.03–0.1)
RETICULOCYTE COUNT AUTOMATED (OLG): 1.59 % (ref 1.1–2.1)
SODIUM SERPL-SCNC: 142 MMOL/L (ref 136–145)
TIBC SERPL-MCNC: 216 UG/DL (ref 60–240)
TIBC SERPL-MCNC: 319 UG/DL (ref 250–450)
TRANSFERRIN SERPL-MCNC: 291 MG/DL (ref 174–364)
WBC # BLD AUTO: 4.93 X10(3)/MCL (ref 4.5–11.5)

## 2025-06-04 PROCEDURE — 83550 IRON BINDING TEST: CPT

## 2025-06-04 PROCEDURE — 87536 HIV-1 QUANT&REVRSE TRNSCRPJ: CPT

## 2025-06-04 PROCEDURE — 36415 COLL VENOUS BLD VENIPUNCTURE: CPT

## 2025-06-04 PROCEDURE — 82728 ASSAY OF FERRITIN: CPT

## 2025-06-04 PROCEDURE — 80053 COMPREHEN METABOLIC PANEL: CPT

## 2025-06-04 PROCEDURE — 85045 AUTOMATED RETICULOCYTE COUNT: CPT

## 2025-06-04 PROCEDURE — 86360 T CELL ABSOLUTE COUNT/RATIO: CPT

## 2025-06-04 PROCEDURE — 85025 COMPLETE CBC W/AUTO DIFF WBC: CPT

## 2025-06-06 LAB
CD3 CELLS # BLD: 1091 CELLS/MCL (ref 550–2202)
CD3 CELLS NFR BLD: 77 % (ref 58–86)
CD3+CD4+ CELLS # BLD: 467 CELLS/MCL (ref 365–1437)
CD3+CD4+ CELLS NFR BLD: 33 % (ref 32–64)
CD3+CD4+ CELLS/CD3+CD8+ CLL BLD: 0.8 %
CD3+CD8+ CELLS # BLD: 581 CELLS/MCL (ref 171–846)
CD3+CD8+ CELLS NFR BLD: 41 % (ref 15–40)
CD45 CELLS # BLD: 1.41 THOU/MCL (ref 0.82–2.84)
HIV1 RNA # PLAS NAA DL=20: 98 COPIES/ML

## 2025-07-16 ENCOUNTER — OFFICE VISIT (OUTPATIENT)
Dept: INFECTIOUS DISEASES | Facility: CLINIC | Age: 35
End: 2025-07-16
Payer: COMMERCIAL

## 2025-07-16 ENCOUNTER — LAB VISIT (OUTPATIENT)
Dept: LAB | Facility: HOSPITAL | Age: 35
End: 2025-07-16
Attending: NURSE PRACTITIONER
Payer: COMMERCIAL

## 2025-07-16 VITALS
DIASTOLIC BLOOD PRESSURE: 78 MMHG | SYSTOLIC BLOOD PRESSURE: 127 MMHG | RESPIRATION RATE: 16 BRPM | WEIGHT: 164.13 LBS | BODY MASS INDEX: 24.88 KG/M2 | HEART RATE: 85 BPM | HEIGHT: 68 IN | TEMPERATURE: 98 F

## 2025-07-16 DIAGNOSIS — F31.9 BIPOLAR AFFECTIVE DISORDER, REMISSION STATUS UNSPECIFIED: ICD-10-CM

## 2025-07-16 DIAGNOSIS — E55.9 VITAMIN D DEFICIENCY: ICD-10-CM

## 2025-07-16 DIAGNOSIS — Z11.3 ROUTINE SCREENING FOR STI (SEXUALLY TRANSMITTED INFECTION): ICD-10-CM

## 2025-07-16 DIAGNOSIS — D64.9 ANEMIA, UNSPECIFIED TYPE: ICD-10-CM

## 2025-07-16 DIAGNOSIS — Z21 HIV INFECTION, UNSPECIFIED SYMPTOM STATUS: ICD-10-CM

## 2025-07-16 DIAGNOSIS — Z21 HIV INFECTION, UNSPECIFIED SYMPTOM STATUS: Primary | ICD-10-CM

## 2025-07-16 LAB
25(OH)D3+25(OH)D2 SERPL-MCNC: 33 NG/ML (ref 30–80)
ABS NEUT CALC (OHS): 1.76 X10(3)/MCL (ref 2.1–9.2)
ALBUMIN SERPL-MCNC: 3.8 G/DL (ref 3.5–5)
ALBUMIN/GLOB SERPL: 1.1 RATIO (ref 1.1–2)
ALP SERPL-CCNC: 80 UNIT/L (ref 40–150)
ALT SERPL-CCNC: 15 UNIT/L (ref 0–55)
ANION GAP SERPL CALC-SCNC: 5 MEQ/L
AST SERPL-CCNC: 20 UNIT/L (ref 11–45)
BACTERIA #/AREA URNS AUTO: ABNORMAL /HPF
BILIRUB SERPL-MCNC: 0.2 MG/DL
BILIRUB UR QL STRIP.AUTO: NEGATIVE
BUN SERPL-MCNC: 12.1 MG/DL (ref 8.9–20.6)
C TRACH DNA SPEC QL NAA+PROBE: NOT DETECTED
C TRACH DNA SPEC QL NAA+PROBE: NOT DETECTED
CALCIUM SERPL-MCNC: 9.2 MG/DL (ref 8.4–10.2)
CHLORIDE SERPL-SCNC: 109 MMOL/L (ref 98–107)
CHOLEST SERPL-MCNC: 123 MG/DL
CHOLEST/HDLC SERPL: 3 {RATIO} (ref 0–5)
CLARITY UR: CLEAR
CO2 SERPL-SCNC: 28 MMOL/L (ref 22–29)
COLOR UR AUTO: ABNORMAL
CREAT SERPL-MCNC: 1.11 MG/DL (ref 0.72–1.25)
CREAT/UREA NIT SERPL: 11
EOSINOPHIL NFR BLD MANUAL: 0.08 X10(3)/MCL (ref 0–0.9)
EOSINOPHIL NFR BLD MANUAL: 2 % (ref 0–8)
ERYTHROCYTE [DISTWIDTH] IN BLOOD BY AUTOMATED COUNT: 12.1 % (ref 11.5–17)
GFR SERPLBLD CREATININE-BSD FMLA CKD-EPI: >60 ML/MIN/1.73/M2
GLOBULIN SER-MCNC: 3.4 GM/DL (ref 2.4–3.5)
GLUCOSE SERPL-MCNC: 91 MG/DL (ref 74–100)
GLUCOSE UR QL STRIP: NORMAL
HAV IGM SERPL QL IA: NONREACTIVE
HBV CORE IGM SERPL QL IA: NONREACTIVE
HBV SURFACE AG SERPL QL IA: NONREACTIVE
HCT VFR BLD AUTO: 38.3 % (ref 42–52)
HCV AB SERPL QL IA: NONREACTIVE
HDLC SERPL-MCNC: 45 MG/DL (ref 35–60)
HGB BLD-MCNC: 13.2 G/DL (ref 14–18)
HGB UR QL STRIP: NEGATIVE
HYALINE CASTS #/AREA URNS LPF: ABNORMAL /LPF
KETONES UR QL STRIP: NEGATIVE
LDLC SERPL CALC-MCNC: 71 MG/DL (ref 50–140)
LEUKOCYTE ESTERASE UR QL STRIP: NEGATIVE
LYMPH ABN # BLD MANUAL: 34 %
LYMPHOCYTES NFR BLD MANUAL: 0.66 X10(3)/MCL (ref 0.6–4.6)
LYMPHOCYTES NFR BLD MANUAL: 17 % (ref 13–40)
MCH RBC QN AUTO: 33.2 PG (ref 27–31)
MCHC RBC AUTO-ENTMCNC: 34.5 G/DL (ref 33–36)
MCV RBC AUTO: 96.5 FL (ref 80–94)
MONOCYTES NFR BLD MANUAL: 0.08 X10(3)/MCL (ref 0.1–1.3)
MONOCYTES NFR BLD MANUAL: 2 % (ref 2–11)
MUCOUS THREADS URNS QL MICRO: ABNORMAL /LPF
N GONORRHOEA DNA SPEC QL NAA+PROBE: NOT DETECTED
N GONORRHOEA DNA SPEC QL NAA+PROBE: NOT DETECTED
NEUTROPHILS NFR BLD MANUAL: 45 % (ref 47–80)
NITRITE UR QL STRIP: NEGATIVE
NRBC BLD AUTO-RTO: 0 %
PH UR STRIP: 6 [PH]
PLATELET # BLD AUTO: 326 X10(3)/MCL (ref 130–400)
PMV BLD AUTO: 9.2 FL (ref 7.4–10.4)
POTASSIUM SERPL-SCNC: 3.9 MMOL/L (ref 3.5–5.1)
PROT SERPL-MCNC: 7.2 GM/DL (ref 6.4–8.3)
PROT UR QL STRIP: NEGATIVE
RBC # BLD AUTO: 3.97 X10(6)/MCL (ref 4.7–6.1)
RBC #/AREA URNS AUTO: ABNORMAL /HPF
SODIUM SERPL-SCNC: 142 MMOL/L (ref 136–145)
SP GR UR STRIP.AUTO: 1.01 (ref 1–1.03)
SPECIMEN SOURCE: NORMAL
SPECIMEN SOURCE: NORMAL
SQUAMOUS #/AREA URNS LPF: ABNORMAL /HPF
T PALLIDUM AB SER QL: NONREACTIVE
TRIGL SERPL-MCNC: 34 MG/DL (ref 34–140)
TSH SERPL-ACNC: 1.22 UIU/ML (ref 0.35–4.94)
UROBILINOGEN UR STRIP-ACNC: NORMAL
VLDLC SERPL CALC-MCNC: 7 MG/DL
WBC # BLD AUTO: 3.91 X10(3)/MCL (ref 4.5–11.5)
WBC #/AREA URNS AUTO: ABNORMAL /HPF

## 2025-07-16 PROCEDURE — 3074F SYST BP LT 130 MM HG: CPT | Mod: CPTII,,, | Performed by: NURSE PRACTITIONER

## 2025-07-16 PROCEDURE — 36415 COLL VENOUS BLD VENIPUNCTURE: CPT

## 2025-07-16 PROCEDURE — 80053 COMPREHEN METABOLIC PANEL: CPT

## 2025-07-16 PROCEDURE — 86361 T CELL ABSOLUTE COUNT: CPT

## 2025-07-16 PROCEDURE — 84443 ASSAY THYROID STIM HORMONE: CPT

## 2025-07-16 PROCEDURE — 87591 N.GONORRHOEAE DNA AMP PROB: CPT | Mod: 59 | Performed by: NURSE PRACTITIONER

## 2025-07-16 PROCEDURE — 1159F MED LIST DOCD IN RCRD: CPT | Mod: CPTII,,, | Performed by: NURSE PRACTITIONER

## 2025-07-16 PROCEDURE — 86780 TREPONEMA PALLIDUM: CPT

## 2025-07-16 PROCEDURE — 87536 HIV-1 QUANT&REVRSE TRNSCRPJ: CPT

## 2025-07-16 PROCEDURE — 3078F DIAST BP <80 MM HG: CPT | Mod: CPTII,,, | Performed by: NURSE PRACTITIONER

## 2025-07-16 PROCEDURE — 99214 OFFICE O/P EST MOD 30 MIN: CPT | Mod: PBBFAC | Performed by: NURSE PRACTITIONER

## 2025-07-16 PROCEDURE — 82306 VITAMIN D 25 HYDROXY: CPT

## 2025-07-16 PROCEDURE — 85025 COMPLETE CBC W/AUTO DIFF WBC: CPT

## 2025-07-16 PROCEDURE — 1160F RVW MEDS BY RX/DR IN RCRD: CPT | Mod: CPTII,,, | Performed by: NURSE PRACTITIONER

## 2025-07-16 PROCEDURE — 99214 OFFICE O/P EST MOD 30 MIN: CPT | Mod: S$PBB,,, | Performed by: NURSE PRACTITIONER

## 2025-07-16 PROCEDURE — 81015 MICROSCOPIC EXAM OF URINE: CPT | Performed by: NURSE PRACTITIONER

## 2025-07-16 PROCEDURE — 3008F BODY MASS INDEX DOCD: CPT | Mod: CPTII,,, | Performed by: NURSE PRACTITIONER

## 2025-07-16 PROCEDURE — 80074 ACUTE HEPATITIS PANEL: CPT

## 2025-07-16 PROCEDURE — 0219U NFCT AGT HIV GNRJ SEQ ALYS: CPT

## 2025-07-16 PROCEDURE — 80061 LIPID PANEL: CPT

## 2025-07-16 PROCEDURE — 87491 CHLMYD TRACH DNA AMP PROBE: CPT | Performed by: NURSE PRACTITIONER

## 2025-07-16 NOTE — PROGRESS NOTES
Patient ID: Ramo Aguirre 34 y.o.     Chief Complaint:   Chief Complaint   Patient presents with    Follow-up     B20        HPI:    7/16/25  Ramo Aguirre is a 35 y/o AAM here for HIV f/u.  Dx 7/10/24. HLA  neg, G6PD deficiency.  MSM, versatile.  Pt reports adherence to Biktarvy. States the Depakote was discontinued so he did not switch over to Symtuza. He stayed on the Symtuza.  Past HIV GT 7/24/24 NNRTI NESSA K103N with resistance to Sustiva. No other Richard or resistance noted. Pt denies fever, headache, chills, visual problems, icterus, jaundice, N/V/D, esophageal varices, SOB, cough, abd pain, ascites or mary colored stools. Reviewed labs from 6.4.25 HIV VL 98 and CD4 467. Pt states he has been sexually active and appreciates STI screening.  She attended eye clinic 12/9/24. Pt smokes approx 1 ppd. Cessation encouraged.      6/3/25  Ramo Aguirre is a 35 y/o AAM via telemed for HIV f/u. Dx 7/10/24. HLA  neg, G6PD deficiency.  MSM, versatile.  Pt reports adherence to Biktarvy. States he may have missed one dose. Past HIV GT 7/24/24 NNRTI NESSA K103N with resistance to Sustiva. No other Richard or resistance noted.  Pt denies fever, headache, chills, visual problems, N/V/D, SOB, cough, chest pain or abd pain. Reviewed labs from 5/29/25 HIV , hgb 12.4, hct 36.2.  11/1/24 CD4 437.  Pt has remained with a low level viremia. He is on Depakote which may be reducing the Bictegravir levels so at this juncture, I recommend switching medications.  STI screening negative 11/1/24.  Pt attended eye clinic 12/9/24. He smokes 1 pack of cigarettes per day. Cessation encouraged.       2/21/25  Ramo Aguirre is a 35 y/o AAM HIV patient here for HIV f/u.  Dx 7/10/24. HLA  neg, G6PD deficiency.  MSM, versatile.  Pt reports adherence to Biktarvy. States he is tolerating the meds well. Past HIV GT 7/24/24 NNRTI NESSA K103N with resistance to Sustiva. No other Richard or resistance noted.  Pt denies fever, headache,  chills, visual problems, N/V/D, SOB, cough, chest pain or abd pain. Reviewed labs from 11/1/24 HIV  and CD4 437.  STI screening negative 11/1/24.  Pt attended eye clinic 12/9/24.  Pt smokes 1 pack of cigarettes per day. Cessation encouraged.                Past Medical History:   Diagnosis Date    Bipolar disorder, unspecified     HIV infection         History reviewed. No pertinent surgical history.     Social History     Socioeconomic History    Marital status: Single   Tobacco Use    Smoking status: Every Day     Current packs/day: 1.00     Types: Cigarettes    Smokeless tobacco: Never   Substance and Sexual Activity    Alcohol use: Never    Drug use: Yes     Types: Marijuana    Sexual activity: Yes     Partners: Male        Family History   Problem Relation Name Age of Onset    No Known Problems Mother      No Known Problems Father      No Known Problems Sister      Diabetes Maternal Grandmother      Glaucoma Maternal Grandmother      Cataracts Maternal Grandmother      Cancer Maternal Grandmother          Review of patient's allergies indicates:  No Known Allergies     Immunization History   Administered Date(s) Administered    COVID-19, MRNA, LN-S, PF (Pfizer) (Purple Cap) 10/12/2021, 11/09/2021    DTaP 1991, 02/17/1992, 04/16/1992, 10/01/1993, 10/05/1995    HIB 1991, 02/17/1992, 04/16/1992, 01/05/1993    HPV 9-Valent 11/01/2024, 01/02/2025, 05/01/2025    Hepatitis A, Adult 12/07/2016, 08/16/2017    Hepatitis B, Adult 11/22/2005, 03/23/2006    IPV 1991, 02/17/1992, 10/01/1993, 10/05/1995    Influenza 11/22/2005    Influenza - Quadrivalent 11/21/2016    Influenza - Trivalent - Fluarix, Flulaval, Fluzone, Afluria - PF 09/16/2024    MMR 10/01/1993, 10/05/1995    Meningococcal Conjugate (MCV4P) 07/14/2009    Pneumococcal Conjugate - 20 Valent 09/16/2024    Td (ADULT) 11/22/2005    Tdap 11/01/2024    Varicella 07/12/2000, 07/14/2009        Review of Systems   Constitutional: Negative.   "  HENT: Negative.     Eyes: Negative.    Respiratory: Negative.     Cardiovascular: Negative.    Gastrointestinal: Negative.    Genitourinary: Negative.    Musculoskeletal: Negative.    Skin: Negative.    Neurological: Negative.    Endo/Heme/Allergies: Negative.    Psychiatric/Behavioral: Negative.     All other systems reviewed and are negative.         Objective:      /78   Pulse 85   Temp 98 °F (36.7 °C) (Oral)   Resp 16   Ht 5' 8" (1.727 m)   Wt 74.5 kg (164 lb 2.1 oz)   BMI 24.96 kg/m²      Physical Exam  Vitals reviewed.   Constitutional:       General: He is not in acute distress.     Appearance: Normal appearance.   HENT:      Head: Normocephalic.      Right Ear: External ear normal.      Left Ear: External ear normal.      Nose: Nose normal.      Mouth/Throat:      Mouth: Mucous membranes are moist.      Pharynx: Oropharynx is clear.   Eyes:      General: No scleral icterus.     Extraocular Movements: Extraocular movements intact.      Conjunctiva/sclera: Conjunctivae normal.      Pupils: Pupils are equal, round, and reactive to light.   Cardiovascular:      Rate and Rhythm: Normal rate and regular rhythm.      Pulses: Normal pulses.      Heart sounds: Normal heart sounds.   Pulmonary:      Effort: Pulmonary effort is normal. No respiratory distress.      Breath sounds: Normal breath sounds.   Abdominal:      General: Bowel sounds are normal. There is no distension.      Palpations: Abdomen is soft. There is no mass.      Tenderness: There is no abdominal tenderness. There is no right CVA tenderness or left CVA tenderness.      Hernia: No hernia is present.   Musculoskeletal:         General: No tenderness or signs of injury. Normal range of motion.      Cervical back: Normal range of motion and neck supple.      Right lower leg: No edema.      Left lower leg: No edema.   Lymphadenopathy:      Cervical: No cervical adenopathy.   Skin:     General: Skin is warm and dry.      Capillary Refill: " Capillary refill takes less than 2 seconds.      Findings: No erythema or lesion.   Neurological:      General: No focal deficit present.      Mental Status: He is alert and oriented to person, place, and time. Mental status is at baseline.   Psychiatric:         Mood and Affect: Mood normal.         Behavior: Behavior normal.         Thought Content: Thought content normal.         Judgment: Judgment normal.          Labs:   Lab Results   Component Value Date    WBC 4.93 06/04/2025    HGB 12.7 (L) 06/04/2025    HCT 36.9 (L) 06/04/2025    MCV 96.3 (H) 06/04/2025     06/04/2025       CMP  Sodium   Date Value Ref Range Status   06/04/2025 142 136 - 145 mmol/L Final     Potassium   Date Value Ref Range Status   06/04/2025 3.4 (L) 3.5 - 5.1 mmol/L Final     Chloride   Date Value Ref Range Status   06/04/2025 108 (H) 98 - 107 mmol/L Final     CO2   Date Value Ref Range Status   06/04/2025 26 22 - 29 mmol/L Final     Glucose   Date Value Ref Range Status   06/04/2025 88 74 - 100 mg/dL Final     Blood Urea Nitrogen   Date Value Ref Range Status   06/04/2025 14.6 8.9 - 20.6 mg/dL Final     Creatinine   Date Value Ref Range Status   06/04/2025 1.15 0.72 - 1.25 mg/dL Final     Calcium   Date Value Ref Range Status   06/04/2025 9.4 8.4 - 10.2 mg/dL Final     Protein Total   Date Value Ref Range Status   06/04/2025 7.7 6.4 - 8.3 gm/dL Final     Albumin   Date Value Ref Range Status   06/04/2025 4.1 3.5 - 5.0 g/dL Final     Bilirubin Total   Date Value Ref Range Status   06/04/2025 0.7 <=1.5 mg/dL Final     ALP   Date Value Ref Range Status   06/04/2025 73 40 - 150 unit/L Final     AST   Date Value Ref Range Status   06/04/2025 18 11 - 45 unit/L Final     ALT   Date Value Ref Range Status   06/04/2025 10 0 - 55 unit/L Final     eGFR   Date Value Ref Range Status   06/04/2025 >60 mL/min/1.73/m2 Final     Comment:     Estimated GFR calculated using the CKD-EPI creatinine (2021) equation.     Lab Results   Component Value  Date    TSH 1.286 07/24/2024     Hep C Ab Interp   Date Value Ref Range Status   07/24/2024 Nonreactive Nonreactive Final     Syphilis Antibody   Date Value Ref Range Status   11/01/2024 Nonreactive Nonreactive, Equivocal Final     RPR   Date Value Ref Range Status   07/19/2017 Non reactive  Final     Cholesterol Total   Date Value Ref Range Status   07/24/2024 149 <=200 mg/dL Final     HDL Cholesterol   Date Value Ref Range Status   07/24/2024 36 35 - 60 mg/dL Final     Triglyceride   Date Value Ref Range Status   07/24/2024 69 34 - 140 mg/dL Final     Cholesterol/HDL Ratio   Date Value Ref Range Status   07/24/2024 4 0 - 5 Final     Very Low Density Lipoprotein   Date Value Ref Range Status   07/24/2024 14  Final     LDL Cholesterol   Date Value Ref Range Status   07/24/2024 99.00 50.00 - 140.00 mg/dL Final     Vitamin D   Date Value Ref Range Status   07/24/2024 68 30 - 80 ng/mL Final     Results for orders placed or performed in visit on 02/21/25   CD4 Lymphocytes   Result Value Ref Range    Patient Age 34     WBC Absolute 6,520 4,500 - 11,500 /mm3    Lymph Percent 35 28 - 48 %    Lymph Absolute 2,282 1,260 - 5,520 x10(3)/mcL    CD4 % 25.2 %    CD4 Absolute 575 (L) 589 - 1,505 unit/L    T Cell Interp       Normal absolute lymphocyte count with decreased absolute CD4+ lymphocyte count.    Perez Aleman M.D.     Narrative    This test was developed and its performance characteristics determined by [facility]. It has not been cleared or approved by the US Food and Drug Administration. The FDA does not require this test to go through premarket FDA review. This test is used for clinical purposes. It should not be regarded as investigational or for research. This laboratory is certified under the Clinical Laboratory Improvement Amendments (CLIA) as qualified to perform high complexity clinical laboratory testing.     Results for orders placed or performed in visit on 06/04/25   HIV-1  RNA, Quantitative, PCR with Reflex to Genotype   Result Value Ref Range    HIV-1 RNA Detect/Quant, P 98 (A) Undetected copies/mL     Results for orders placed or performed in visit on 07/24/24   Quantiferon Gold TB   Result Value Ref Range    QuantiFERON-Tb Gold Plus Result Negative Negative    TB1 Ag minus Nil Result -0.06 IU/mL    TB2 Ag minus Nil Result -0.06 IU/mL    Mitogen minus Nil Result >10.00 IU/mL    Nil Result 0.13 IU/mL     Results for orders placed or performed in visit on 11/01/24   C.trach/N.gonor AMP RNA    Specimen: Throat   Result Value Ref Range    Source THROAT     Chlamydia trachomatis amplified RNA Negative Negative    Neisseria gonorrhoeae amplified RNA Negative Negative    SOURCE: THROAT      Results for orders placed or performed in visit on 07/24/24   Urinalysis   Result Value Ref Range    Color, UA Yellow Yellow, Light-Yellow, Dark Yellow, [place], Straw    Appearance, UA Clear Clear    Specific Gravity, UA 1.025 1.005 - 1.030    pH, UA 7.5 5.0 - 8.5    Protein, UA Trace (A) Negative    Glucose, UA Normal Negative, Normal    Ketones, UA Negative Negative    Blood, UA Negative Negative    Bilirubin, UA Negative Negative    Urobilinogen, UA 1+ (A) 0.2, 1.0, Normal    Nitrites, UA Negative Negative    Leukocyte Esterase, UA 75 (A) Negative    RBC, UA 0-5 None Seen, 0-2, 3-5, 0-5 /HPF    WBC, UA 11-20 (A) None Seen, 0-2, 3-5, 0-5 /HPF    Bacteria, UA None Seen None Seen /HPF    Squamous Epithelial Cells, UA None Seen None Seen /HPF    Mucous, UA Trace (A) None Seen /LPF    Hyaline Casts, UA None Seen None Seen /lpf       Imaging: Reviewed most recent relevant imaging studies available, notable results highlighted in this note    Medications:     Current Outpatient Medications   Medication Instructions    afmmknjso-ymnpxtns-ptlujvr ala (BIKTARVY) -25 mg (25 kg or greater) 1 tablet, Oral, Daily       Assessment:       1. HIV infection, unspecified symptom status  -     CD4 Lymphocytes;  Future; Expected date: 07/16/2025  -     CBC Auto Differential; Future; Expected date: 07/16/2025  -     Comprehensive Metabolic Panel; Future; Expected date: 07/16/2025  -     HIV-1 RNA, Quantitative, PCR with Reflex to Genotype; Future; Expected date: 07/16/2025  -     TSH; Future; Expected date: 07/16/2025  -     Lipid Panel; Future; Expected date: 07/16/2025  -     Urinalysis, Reflex to Urine Culture; Future; Expected date: 07/16/2025  -     HIV-1 RNA, Quantitative, PCR with Reflex to Genotype; Future; Expected date: 10/16/2025  -     CBC Auto Differential; Future; Expected date: 10/16/2025  -     Comprehensive Metabolic Panel; Future; Expected date: 10/16/2025    2. Vitamin D deficiency  -     Vitamin D; Future; Expected date: 07/16/2025    3. Anemia, unspecified type    4. Bipolar affective disorder, remission status unspecified    5. Routine screening for STI (sexually transmitted infection)  -     SYPHILIS ANTIBODY (WITH REFLEX RPR); Future; Expected date: 07/16/2025  -     Cancel: Sexually-Transmitted Infections (STIs) Increased Risk Panel; Future; Expected date: 07/16/2025  -     Hepatitis Panel, Acute; Future; Expected date: 07/16/2025  -     Chlamydia/GC, PCR; Future; Expected date: 07/16/2025  -     Chlamydia/GC, PCR; Future; Expected date: 07/16/2025  -     Chlamydia/GC, PCR; Future; Expected date: 07/16/2025  -     C.trach/N.gonor AMP RNA; Future; Expected date: 07/16/2025           Plan:      HIV infection, unspecified symptom status  -     CD4 Lymphocytes; Future; Expected date: 07/16/2025  -     CBC Auto Differential; Future; Expected date: 07/16/2025  -     Comprehensive Metabolic Panel; Future; Expected date: 07/16/2025  -     HIV-1 RNA, Quantitative, PCR with Reflex to Genotype; Future; Expected date: 07/16/2025  -     TSH; Future; Expected date: 07/16/2025  -     Lipid Panel; Future; Expected date: 07/16/2025  -     Urinalysis, Reflex to Urine Culture; Future; Expected date: 07/16/2025  -      HIV-1 RNA, Quantitative, PCR with Reflex to Genotype; Future; Expected date: 10/16/2025  -     CBC Auto Differential; Future; Expected date: 10/16/2025  -     Comprehensive Metabolic Panel; Future; Expected date: 10/16/2025  Dx 7/10/24  Initial HIV VL 4,500,000 and CD4 alejandra 199/17% on 7/24/24  OI hx: none      Adherence and sexual health counseling done  Use condoms for all sexual encounters.  Blood precautions.   Continue Biktarvy    Labs today and 1 week before next visit   RTC 3 months with Ariana for a telemed visit      HIV Wellness:  Anal pap: defer until age 35  Oral CT/GC: 7/16/25  Anal CT/GC:7/16/25  Urine CT/GC: 7/16/25  RPR: 7/16/25  Ophth: 12/9/24  DEXA: defer until age 50  Colon Cancer Screen: defer until age 45  PSA: defer until age 50  Vaccines recommended      Vitamin D deficiency  -     Vitamin D; Future; Expected date: 07/16/2025  Continue meds. Pt educated on the importance of Vit D to bone and organ health.     Anemia, unspecified type    Bipolar affective disorder, remission status unspecified  Keep all appts with mental health     Routine screening for STI (sexually transmitted infection)  -     SYPHILIS ANTIBODY (WITH REFLEX RPR); Future; Expected date: 07/16/2025  -     Cancel: Sexually-Transmitted Infections (STIs) Increased Risk Panel; Future; Expected date: 07/16/2025  -     Hepatitis Panel, Acute; Future; Expected date: 07/16/2025  -     Chlamydia/GC, PCR; Future; Expected date: 07/16/2025  -     Chlamydia/GC, PCR; Future; Expected date: 07/16/2025  -     Chlamydia/GC, PCR; Future; Expected date: 07/16/2025  -     C.trach/N.gonor AMP RNA; Future; Expected date: 07/16/2025  Oral, rectal, and urine CT/GC collected        30 minutes of total time spent on the encounter, which includes face to face time and non-face to face time preparing to see the patient (eg, review of tests), Obtaining and/or reviewing separately obtained history, Documenting clinical information in the electronic or  other health record, Independently interpreting results (not separately reported) and communicating results to the patient/family/caregiver, or Care coordination (not separately reported).

## 2025-07-17 LAB
AGE: 33
C TRACH RRNA SPEC QL NAA+PROBE: NEGATIVE
CD3+CD4+ CELLS # SPEC: 584 UNIT/L (ref 589–1505)
CD3+CD4+ CELLS NFR BLD: 29.3 %
LYMPHOCYTES # BLD AUTO: 1994.1 X10(3)/MCL (ref 1260–5520)
LYMPHOCYTES NFR LN MANUAL: 51 % (ref 28–48)
LYMPHOMA - T-CELL MARKERS SPEC-IMP: ABNORMAL
N GONORRHOEA RRNA SPEC QL NAA+PROBE: NEGATIVE
SPECIMEN SOURCE: NORMAL
SPECIMEN SOURCE: NORMAL
WBC # BLD AUTO: 3910 /MM3 (ref 4500–11500)

## 2025-07-25 LAB — MAYO GENERIC ORDERABLE RESULT: NORMAL
